# Patient Record
Sex: MALE | Race: WHITE | NOT HISPANIC OR LATINO | Employment: FULL TIME | URBAN - NONMETROPOLITAN AREA
[De-identification: names, ages, dates, MRNs, and addresses within clinical notes are randomized per-mention and may not be internally consistent; named-entity substitution may affect disease eponyms.]

---

## 2023-02-16 PROBLEM — E66.3 OVERWEIGHT (BMI 25.0-29.9): Status: ACTIVE | Noted: 2023-02-16

## 2023-02-16 PROBLEM — R68.2 DRY MOUTH: Status: ACTIVE | Noted: 2023-02-16

## 2023-02-16 PROBLEM — S63.619A SPRAIN OF FINGER, RIGHT: Status: ACTIVE | Noted: 2023-02-16

## 2023-02-16 PROBLEM — Z85.818 HISTORY OF MALIGNANT NEOPLASM OF TONSIL: Status: ACTIVE | Noted: 2023-02-16

## 2023-02-16 PROBLEM — E03.9 HYPOTHYROIDISM: Status: ACTIVE | Noted: 2023-02-16

## 2023-02-16 PROBLEM — C10.9 OROPHARYNGEAL CANCER (MULTI): Status: ACTIVE | Noted: 2023-02-16

## 2023-02-16 PROBLEM — R13.12 OROPHARYNGEAL DYSPHAGIA: Status: ACTIVE | Noted: 2023-02-16

## 2023-02-16 PROBLEM — M77.12 LATERAL EPICONDYLITIS OF LEFT ELBOW: Status: ACTIVE | Noted: 2023-02-16

## 2023-02-16 PROBLEM — E78.5 HYPERLIPIDEMIA: Status: ACTIVE | Noted: 2023-02-16

## 2023-02-16 PROBLEM — R07.9 CHEST PAIN, UNSPECIFIED: Status: ACTIVE | Noted: 2023-02-16

## 2023-02-16 PROBLEM — M19.042: Status: ACTIVE | Noted: 2023-02-16

## 2023-02-16 PROBLEM — I10 HYPERTENSION: Status: ACTIVE | Noted: 2023-02-16

## 2023-02-16 RX ORDER — ROSUVASTATIN CALCIUM 10 MG/1
1 TABLET, COATED ORAL DAILY
COMMUNITY
Start: 2020-03-01 | End: 2023-03-30 | Stop reason: SDUPTHER

## 2023-02-16 RX ORDER — LEVOTHYROXINE SODIUM 50 UG/1
50 TABLET ORAL DAILY
COMMUNITY
Start: 2022-05-09 | End: 2023-10-30 | Stop reason: SDUPTHER

## 2023-02-16 RX ORDER — LOSARTAN POTASSIUM 100 MG/1
1 TABLET ORAL DAILY
COMMUNITY
Start: 2022-09-28 | End: 2023-03-29 | Stop reason: SDUPTHER

## 2023-03-29 ENCOUNTER — OFFICE VISIT (OUTPATIENT)
Dept: PRIMARY CARE | Facility: CLINIC | Age: 63
End: 2023-03-29
Payer: COMMERCIAL

## 2023-03-29 VITALS
WEIGHT: 190.2 LBS | HEIGHT: 69 IN | BODY MASS INDEX: 28.17 KG/M2 | HEART RATE: 76 BPM | TEMPERATURE: 97.8 F | OXYGEN SATURATION: 96 % | DIASTOLIC BLOOD PRESSURE: 79 MMHG | SYSTOLIC BLOOD PRESSURE: 120 MMHG | RESPIRATION RATE: 12 BRPM

## 2023-03-29 DIAGNOSIS — E66.3 OVERWEIGHT (BMI 25.0-29.9): ICD-10-CM

## 2023-03-29 DIAGNOSIS — C10.9 OROPHARYNGEAL CANCER (MULTI): ICD-10-CM

## 2023-03-29 DIAGNOSIS — E03.9 ACQUIRED HYPOTHYROIDISM: ICD-10-CM

## 2023-03-29 DIAGNOSIS — I10 PRIMARY HYPERTENSION: Primary | ICD-10-CM

## 2023-03-29 DIAGNOSIS — E78.2 MIXED HYPERLIPIDEMIA: ICD-10-CM

## 2023-03-29 PROBLEM — R07.9 CHEST PAIN, UNSPECIFIED: Status: RESOLVED | Noted: 2023-02-16 | Resolved: 2023-03-29

## 2023-03-29 PROBLEM — S63.619A SPRAIN OF FINGER, RIGHT: Status: RESOLVED | Noted: 2023-02-16 | Resolved: 2023-03-29

## 2023-03-29 PROBLEM — Z85.818 HISTORY OF MALIGNANT NEOPLASM OF TONSIL: Status: RESOLVED | Noted: 2023-02-16 | Resolved: 2023-03-29

## 2023-03-29 PROCEDURE — 1036F TOBACCO NON-USER: CPT | Performed by: FAMILY MEDICINE

## 2023-03-29 PROCEDURE — 3074F SYST BP LT 130 MM HG: CPT | Performed by: FAMILY MEDICINE

## 2023-03-29 PROCEDURE — 99214 OFFICE O/P EST MOD 30 MIN: CPT | Performed by: FAMILY MEDICINE

## 2023-03-29 PROCEDURE — 3078F DIAST BP <80 MM HG: CPT | Performed by: FAMILY MEDICINE

## 2023-03-29 RX ORDER — LOSARTAN POTASSIUM 100 MG/1
100 TABLET ORAL DAILY
Qty: 30 TABLET | Refills: 0 | Status: SHIPPED | OUTPATIENT
Start: 2023-03-29 | End: 2023-03-30 | Stop reason: SDUPTHER

## 2023-03-29 NOTE — PROGRESS NOTES
"Subjective     Patient ID: Raoul Younger is a 62 y.o. male who presents for follow up of chronic medical conditions.    High blood pressure evaluation.     Review of symptoms:  Fatigue: N  Headaches: N  Blurred vision: N  Palpitations: N  Chest pains: N  Shortness of Breath: N  Swelling of legs: N  Blood in urine: N   Taking medications daily: Y  Medication side effects: N  Problems with medication compliance:N  Baby Aspirin 81 mg daily: N     High cholesterol evaluation.     Supplements: N   specific diet plan:  N  exercising 30 min daily?: N     Fatigue:N  Chest pains:N  Shortness of Breath:N  Sudden Headaches: N  Vision loss: N   Syncope (fainting): N   Leg Claudication (limping/leg tiredness): N   Taking medication daily: \Y   Medication side effects:N    HPI  Home blood pressures have been stable since starting losartan. BP usually run in normal range. Highest readings have been in low 130s systolic.    He was seen by Dr. Arboleda for episode of chest tightness on 1/31/22 while out at dinner. He was worried for possible MI but symptoms resolved on their own so he didn't go to the ED. He states it was a stressful night which he thinks may have contributed. EKG and blood work was negative. He has not had any similar symptoms since then.    Review of Systems  constitutional: as per HPI  eyes:as per HPI  CV:as per HPI  Respiratory:as per HPI  GI:as per HPI  neuro:as per HPI  endo:as per HPI  heme/lymph:as per HPI     Objective   /79 (BP Location: Right arm, Patient Position: Sitting, BP Cuff Size: Adult)   Pulse 76   Temp 36.6 °C (97.8 °F) (Temporal)   Resp 12   Ht 1.753 m (5' 9\")   Wt 86.3 kg (190 lb 3.2 oz)   SpO2 96%   BMI 28.09 kg/m²   Physical Exam  Constitutional:       Appearance: Normal appearance. He is overweight.   Cardiovascular:      Rate and Rhythm: Normal rate and regular rhythm.   Pulmonary:      Effort: Pulmonary effort is normal.      Breath sounds: Normal breath sounds. "         Assessment/Plan   Problem List Items Addressed This Visit          Circulatory    Primary hypertension - Primary     Stable  Blood pressure in office today was   BP Readings from Last 1 Encounters:   03/29/23 120/79   The goal range for blood pressure is below 130/80.   We will continue to monitor.   Continue losartan 100 mg daily.         Relevant Medications    losartan (Cozaar) 100 mg tablet       Endocrine/Metabolic    Acquired hypothyroidism     Stable  Continue Synthroid 50 mcg daily.         Overweight (BMI 25.0-29.9)     Body mass index is 28.09 kg/m².  Ideal weight is BMI 25 or under.  Think of healthy lifestyle changes rather than a diet  Weight loss is 75% diet and 25% exercise   Think of daily plate that includes 50% vegetables and do not count peas, corn, white potatoes as a vegetable. 25% complex grains/starch, 25% protein/fat.  Ideal diet is predominately plant based - Vegetables and Fruit. Protein from non meat sources ideal (whole beans, chickpeas). If choosing meat source for protein - first choice is fish in omega-3 such as [place]. Next choice is skinless poultry and last choice and infrequent should be red meat.  Weight loss can be helped through mindful eating. There are apps that can be used to assist with keeping track of your food water and exercise, such as My fitness pal Can see nutritionist if preferred.   Losing 4-6 lbs a month is a sufficient means of gradually losing and maintaining weight loss (good healthy, long term weight loss).             Other    Mixed hyperlipidemia     Stable.  Continue rosuvastatin 10 mg daily.         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 No evidence of disease.  Follows with ENT Dr. Faust. Planning for follow up in April/May.               By signing my name below I, misti Castillo, attest that this documentation has been prepared under the direction and in the presence of   Mansoor Floyd. 03/30/23

## 2023-03-29 NOTE — ASSESSMENT & PLAN NOTE
Body mass index is 28.09 kg/m².  Ideal weight is BMI 25 or under.  Think of healthy lifestyle changes rather than a diet  Weight loss is 75% diet and 25% exercise   Think of daily plate that includes 50% vegetables and do not count peas, corn, white potatoes as a vegetable. 25% complex grains/starch, 25% protein/fat.  Ideal diet is predominately plant based - Vegetables and Fruit. Protein from non meat sources ideal (whole beans, chickpeas). If choosing meat source for protein - first choice is fish in omega-3 such as [place]. Next choice is skinless poultry and last choice and infrequent should be red meat.  Weight loss can be helped through mindful eating. There are apps that can be used to assist with keeping track of your food water and exercise, such as My fitness pal Can see nutritionist if preferred.   Losing 4-6 lbs a month is a sufficient means of gradually losing and maintaining weight loss (good healthy, long term weight loss).

## 2023-03-29 NOTE — ASSESSMENT & PLAN NOTE
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 No evidence of disease.  Follows with ENT Dr. Faust. Planning for follow up in April/May.

## 2023-03-29 NOTE — ASSESSMENT & PLAN NOTE
Stable  Blood pressure in office today was   BP Readings from Last 1 Encounters:   03/29/23 120/79     The goal range for blood pressure is below 130/80.   We will continue to monitor.   Continue losartan 100 mg daily.

## 2023-03-30 DIAGNOSIS — E78.2 MIXED HYPERLIPIDEMIA: Primary | ICD-10-CM

## 2023-03-30 DIAGNOSIS — I10 PRIMARY HYPERTENSION: ICD-10-CM

## 2023-03-30 RX ORDER — LOSARTAN POTASSIUM 100 MG/1
100 TABLET ORAL DAILY
Qty: 90 TABLET | Refills: 1 | Status: SHIPPED | OUTPATIENT
Start: 2023-03-30 | End: 2023-03-31 | Stop reason: SDUPTHER

## 2023-03-30 RX ORDER — ROSUVASTATIN CALCIUM 10 MG/1
10 TABLET, COATED ORAL DAILY
Qty: 90 TABLET | Refills: 1 | Status: SHIPPED | OUTPATIENT
Start: 2023-03-30 | End: 2024-04-01

## 2023-03-31 DIAGNOSIS — I10 PRIMARY HYPERTENSION: ICD-10-CM

## 2023-03-31 RX ORDER — LOSARTAN POTASSIUM 100 MG/1
100 TABLET ORAL DAILY
Qty: 30 TABLET | Refills: 0 | Status: SHIPPED | OUTPATIENT
Start: 2023-03-31 | End: 2023-04-11 | Stop reason: SDUPTHER

## 2023-04-11 DIAGNOSIS — I10 PRIMARY HYPERTENSION: ICD-10-CM

## 2023-04-11 RX ORDER — LOSARTAN POTASSIUM 100 MG/1
100 TABLET ORAL DAILY
Qty: 90 TABLET | Refills: 1 | Status: SHIPPED | OUTPATIENT
Start: 2023-04-11 | End: 2023-10-03 | Stop reason: DRUGHIGH

## 2023-06-19 LAB — THYROTROPIN (MIU/L) IN SER/PLAS BY DETECTION LIMIT <= 0.05 MIU/L: 3.61 MIU/L (ref 0.44–3.98)

## 2023-10-03 ENCOUNTER — OFFICE VISIT (OUTPATIENT)
Dept: PRIMARY CARE | Facility: CLINIC | Age: 63
End: 2023-10-03
Payer: COMMERCIAL

## 2023-10-03 VITALS
RESPIRATION RATE: 14 BRPM | SYSTOLIC BLOOD PRESSURE: 97 MMHG | TEMPERATURE: 98 F | WEIGHT: 192.2 LBS | DIASTOLIC BLOOD PRESSURE: 60 MMHG | BODY MASS INDEX: 26.81 KG/M2 | HEART RATE: 82 BPM | OXYGEN SATURATION: 92 %

## 2023-10-03 DIAGNOSIS — Z00.00 WELLNESS EXAMINATION: ICD-10-CM

## 2023-10-03 DIAGNOSIS — Z12.5 PROSTATE CANCER SCREENING: ICD-10-CM

## 2023-10-03 DIAGNOSIS — C10.9 OROPHARYNGEAL CANCER (MULTI): ICD-10-CM

## 2023-10-03 DIAGNOSIS — E03.9 HYPOTHYROIDISM, UNSPECIFIED TYPE: ICD-10-CM

## 2023-10-03 DIAGNOSIS — Z11.4 ENCOUNTER FOR SCREENING FOR HIV: ICD-10-CM

## 2023-10-03 DIAGNOSIS — E03.9 ACQUIRED HYPOTHYROIDISM: ICD-10-CM

## 2023-10-03 DIAGNOSIS — E78.2 MIXED HYPERLIPIDEMIA: Primary | ICD-10-CM

## 2023-10-03 DIAGNOSIS — I10 PRIMARY HYPERTENSION: ICD-10-CM

## 2023-10-03 DIAGNOSIS — E66.3 OVERWEIGHT (BMI 25.0-29.9): ICD-10-CM

## 2023-10-03 PROBLEM — G89.29 CHRONIC PAIN OF LEFT THUMB: Status: ACTIVE | Noted: 2023-10-03

## 2023-10-03 PROBLEM — M79.645 CHRONIC PAIN OF LEFT THUMB: Status: ACTIVE | Noted: 2023-10-03

## 2023-10-03 PROBLEM — M77.12 LATERAL EPICONDYLITIS OF LEFT ELBOW: Status: RESOLVED | Noted: 2023-02-16 | Resolved: 2023-10-03

## 2023-10-03 PROBLEM — M19.042: Status: RESOLVED | Noted: 2023-02-16 | Resolved: 2023-10-03

## 2023-10-03 PROCEDURE — 3074F SYST BP LT 130 MM HG: CPT | Performed by: FAMILY MEDICINE

## 2023-10-03 PROCEDURE — 1036F TOBACCO NON-USER: CPT | Performed by: FAMILY MEDICINE

## 2023-10-03 PROCEDURE — 3078F DIAST BP <80 MM HG: CPT | Performed by: FAMILY MEDICINE

## 2023-10-03 PROCEDURE — 99214 OFFICE O/P EST MOD 30 MIN: CPT | Performed by: FAMILY MEDICINE

## 2023-10-03 RX ORDER — LOSARTAN POTASSIUM 100 MG/1
50 TABLET ORAL DAILY
Qty: 90 TABLET | Refills: 1 | Status: SHIPPED | OUTPATIENT
Start: 2023-10-03 | End: 2023-11-06

## 2023-10-03 ASSESSMENT — PATIENT HEALTH QUESTIONNAIRE - PHQ9
1. LITTLE INTEREST OR PLEASURE IN DOING THINGS: NOT AT ALL
2. FEELING DOWN, DEPRESSED OR HOPELESS: NOT AT ALL
SUM OF ALL RESPONSES TO PHQ9 QUESTIONS 1 AND 2: 0

## 2023-10-03 NOTE — ASSESSMENT & PLAN NOTE
Stable  Blood pressure in office today was   BP Readings from Last 1 Encounters:   10/03/23 97/60     The goal range for blood pressure is below 130/80.   We will continue to monitor.   reduce losartan 100 mg to 50mg daily   Blood pressure in office today was   BP Readings from Last 1 Encounters:   10/03/23 97/60     The goal range for blood pressure is below 130/80.   We will continue to monitor.   Continue losartan 100 mg daily.

## 2023-10-03 NOTE — ASSESSMENT & PLAN NOTE
If yawn, stretch neck with extension, will get right sided anterior pain that is temporary  1 occurrence in last 4 months

## 2023-10-03 NOTE — ASSESSMENT & PLAN NOTE
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

## 2023-10-03 NOTE — PROGRESS NOTES
Subjective     Patient ID: Raoul Younger is a 63 y.o. male who presents for follow up of chronic medical conditions.    Declines flu shot  Tdap: 08/10/2020  Cologuard: 09/14/2021    Right thumb has been giving him issues for the past couple of months. Has used a brace and tried to iceolate it. He is able to physical move the thumb but when he tells his brain to move it, it will not do it.     High blood pressure evaluation.     Review of symptoms:  Fatigue: N  Headaches:  N  Blurred vision:  N  Palpitations: N  Chest pains: N  Shortness of Breath: N  Swelling of legs: N  Blood in urine: N  Taking medications daily: Y  Medication side effects: N  Problems with medication compliance:N  Baby Aspirin 81 mg daily:  N    High cholesterol evaluation.      Supplements: N  specific diet plan: N  exercising 30 min daily?: N    Fatigue:N  Chest pains:N  Shortness of Breath:N  Sudden Headaches: N  Vision loss: N  Syncope (fainting): N  Leg Claudication (limping/leg tiredness): N  Taking medication daily: Y   Medication side effects:N            HPI    Review of Systems    Objective   BP 97/60 (BP Location: Right arm, Patient Position: Sitting, BP Cuff Size: Adult)   Pulse 82   Temp 36.7 °C (98 °F) (Temporal)   Resp 14   Wt 87.2 kg (192 lb 3.2 oz)   SpO2 92%   BMI 26.81 kg/m²   Physical Exam  Constitutional:       Appearance: Normal appearance.   Cardiovascular:      Rate and Rhythm: Normal rate and regular rhythm.      Pulses: Normal pulses.      Heart sounds: Normal heart sounds.   Pulmonary:      Effort: Pulmonary effort is normal.      Breath sounds: Normal breath sounds.   Neurological:      Mental Status: He is alert.         Assessment/Plan   Problem List Items Addressed This Visit       Mixed hyperlipidemia - Primary     Stable.  Lipid panel 6/3/2022 demonstrated good control  Due for fasting lipid to update status  Order placed  Continue rosuvastatin 10 mg daily.         Primary hypertension     Stable  Blood  pressure in office today was   BP Readings from Last 1 Encounters:   10/03/23 97/60   The goal range for blood pressure is below 130/80.   We will continue to monitor.   reduce losartan 100 mg to 50mg daily   Blood pressure in office today was   BP Readings from Last 1 Encounters:   10/03/23 97/60   The goal range for blood pressure is below 130/80.   We will continue to monitor.   Continue losartan 100 mg daily.         Relevant Medications    losartan (Cozaar) 100 mg tablet    Acquired hypothyroidism     Stable  Continue Synthroid 50 mcg daily.         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  Ideal weight is BMI 25 or under.  Think of healthy lifestyle changes rather than a diet  Weight loss is 75% diet and 25% exercise   Think of daily plate that includes 50% vegetables and do not count peas, corn, white potatoes as a vegetable. 25% complex grains/starch, 25% protein/fat.  Ideal diet is predominately plant based - Vegetables and Fruit. Protein from non meat sources ideal (whole beans, chickpeas). If choosing meat source for protein - first choice is fish in omega-3 such as [place]. Next choice is skinless poultry and last choice and infrequent should be red meat.  Weight loss can be helped through mindful eating. There are apps that can be used to assist with keeping track of your food water and exercise, such as My fitness pal Can see nutritionist if preferred.   Losing 4-6 lbs a month is a sufficient means of gradually losing and maintaining weight loss (good healthy, long term weight loss).           Other Visit Diagnoses       Wellness examination        Relevant Orders    CBC    Lipid Panel    Comprehensive Metabolic Panel     Prostate cancer screening        Relevant Orders    Prostate Specific Antigen, Screen    Hypothyroidism, unspecified type        Relevant Orders    TSH with reflex to Free T4 if abnormal    Encounter for screening for HIV        Relevant Orders    HIV 1/2 Antigen/Antibody Screen with Reflex to Confirmation

## 2023-10-03 NOTE — ASSESSMENT & PLAN NOTE
Stable.  Lipid panel 6/3/2022 demonstrated good control  Due for fasting lipid to update status  Order placed  Continue rosuvastatin 10 mg daily.

## 2023-10-03 NOTE — ASSESSMENT & PLAN NOTE
Cannot flex at IP joint fully  Pain at base of thumb    Refer ortho  Not want [facility] due to distance  [facility]commended [facility] Dr Tony Zhu

## 2023-10-06 NOTE — ASSESSMENT & PLAN NOTE
Body mass index is 26.81 kg/m².  Ideal weight is BMI 25 or under.  Think of healthy lifestyle changes rather than a diet  Weight loss is 75% diet and 25% exercise   Think of daily plate that includes 50% vegetables and do not count peas, corn, white potatoes as a vegetable. 25% complex grains/starch, 25% protein/fat.  Ideal diet is predominately plant based - Vegetables and Fruit. Protein from non meat sources ideal (whole beans, chickpeas). If choosing meat source for protein - first choice is fish in omega-3 such as [place]. Next choice is skinless poultry and last choice and infrequent should be red meat.  Weight loss can be helped through mindful eating. There are apps that can be used to assist with keeping track of your food water and exercise, such as My fitness pal Can see nutritionist if preferred.   Losing 4-6 lbs a month is a sufficient means of gradually losing and maintaining weight loss (good healthy, long term weight loss).

## 2023-10-07 ENCOUNTER — ANCILLARY PROCEDURE (OUTPATIENT)
Dept: RADIOLOGY | Facility: CLINIC | Age: 63
End: 2023-10-07
Payer: COMMERCIAL

## 2023-10-07 ENCOUNTER — LAB (OUTPATIENT)
Dept: LAB | Facility: LAB | Age: 63
End: 2023-10-07
Payer: COMMERCIAL

## 2023-10-07 DIAGNOSIS — Z00.00 WELLNESS EXAMINATION: ICD-10-CM

## 2023-10-07 DIAGNOSIS — Z12.5 PROSTATE CANCER SCREENING: ICD-10-CM

## 2023-10-07 DIAGNOSIS — R73.03 PREDIABETES: ICD-10-CM

## 2023-10-07 DIAGNOSIS — Z11.4 ENCOUNTER FOR SCREENING FOR HIV: ICD-10-CM

## 2023-10-07 DIAGNOSIS — E03.9 HYPOTHYROIDISM, UNSPECIFIED TYPE: ICD-10-CM

## 2023-10-07 DIAGNOSIS — C10.9 OROPHARYNGEAL CANCER (MULTI): ICD-10-CM

## 2023-10-07 LAB
ALBUMIN SERPL BCP-MCNC: 4.3 G/DL (ref 3.4–5)
ALP SERPL-CCNC: 49 U/L (ref 33–136)
ALT SERPL W P-5'-P-CCNC: 23 U/L (ref 10–52)
ANION GAP SERPL CALC-SCNC: 14 MMOL/L (ref 10–20)
AST SERPL W P-5'-P-CCNC: 19 U/L (ref 9–39)
BILIRUB SERPL-MCNC: 0.5 MG/DL (ref 0–1.2)
BUN SERPL-MCNC: 13 MG/DL (ref 6–23)
CALCIUM SERPL-MCNC: 9.5 MG/DL (ref 8.6–10.6)
CHLORIDE SERPL-SCNC: 107 MMOL/L (ref 98–107)
CHOLEST SERPL-MCNC: 176 MG/DL (ref 0–199)
CHOLESTEROL/HDL RATIO: 2.5
CO2 SERPL-SCNC: 27 MMOL/L (ref 21–32)
CREAT SERPL-MCNC: 1.02 MG/DL (ref 0.5–1.3)
ERYTHROCYTE [DISTWIDTH] IN BLOOD BY AUTOMATED COUNT: 12.1 % (ref 11.5–14.5)
GFR SERPL CREATININE-BSD FRML MDRD: 82 ML/MIN/1.73M*2
GLUCOSE SERPL-MCNC: 119 MG/DL (ref 74–99)
HCT VFR BLD AUTO: 46.4 % (ref 41–52)
HDLC SERPL-MCNC: 69.1 MG/DL
HGB BLD-MCNC: 15.2 G/DL (ref 13.5–17.5)
HIV 1+2 AB+HIV1 P24 AG SERPL QL IA: NONREACTIVE
LDLC SERPL CALC-MCNC: 96 MG/DL (ref 140–190)
MCH RBC QN AUTO: 30.7 PG (ref 26–34)
MCHC RBC AUTO-ENTMCNC: 32.8 G/DL (ref 32–36)
MCV RBC AUTO: 94 FL (ref 80–100)
NON HDL CHOLESTEROL: 107 MG/DL (ref 0–149)
NRBC BLD-RTO: 0 /100 WBCS (ref 0–0)
PLATELET # BLD AUTO: 200 X10*3/UL (ref 150–450)
PMV BLD AUTO: 11.4 FL (ref 7.5–11.5)
POTASSIUM SERPL-SCNC: 4.5 MMOL/L (ref 3.5–5.3)
PROT SERPL-MCNC: 7.1 G/DL (ref 6.4–8.2)
RBC # BLD AUTO: 4.95 X10*6/UL (ref 4.5–5.9)
SODIUM SERPL-SCNC: 143 MMOL/L (ref 136–145)
TRIGL SERPL-MCNC: 54 MG/DL (ref 0–149)
VLDL: 11 MG/DL (ref 0–40)
WBC # BLD AUTO: 5 X10*3/UL (ref 4.4–11.3)

## 2023-10-07 PROCEDURE — 83036 HEMOGLOBIN GLYCOSYLATED A1C: CPT

## 2023-10-07 PROCEDURE — 87389 HIV-1 AG W/HIV-1&-2 AB AG IA: CPT

## 2023-10-07 PROCEDURE — 85027 COMPLETE CBC AUTOMATED: CPT

## 2023-10-07 PROCEDURE — 84153 ASSAY OF PSA TOTAL: CPT

## 2023-10-07 PROCEDURE — 80053 COMPREHEN METABOLIC PANEL: CPT

## 2023-10-07 PROCEDURE — 71046 X-RAY EXAM CHEST 2 VIEWS: CPT | Mod: FY

## 2023-10-07 PROCEDURE — 84443 ASSAY THYROID STIM HORMONE: CPT

## 2023-10-07 PROCEDURE — 71046 X-RAY EXAM CHEST 2 VIEWS: CPT | Performed by: RADIOLOGY

## 2023-10-07 PROCEDURE — 36415 COLL VENOUS BLD VENIPUNCTURE: CPT

## 2023-10-07 PROCEDURE — 80061 LIPID PANEL: CPT

## 2023-10-08 LAB
PSA SERPL-MCNC: 1.04 NG/ML
TSH SERPL-ACNC: 2.88 MIU/L (ref 0.44–3.98)

## 2023-10-09 DIAGNOSIS — R73.03 PREDIABETES: Primary | ICD-10-CM

## 2023-10-09 LAB
EST. AVERAGE GLUCOSE BLD GHB EST-MCNC: 123 MG/DL
HBA1C MFR BLD: 5.9 %

## 2023-10-15 ENCOUNTER — TELEPHONE (OUTPATIENT)
Dept: PRIMARY CARE | Facility: CLINIC | Age: 63
End: 2023-10-15
Payer: COMMERCIAL

## 2023-10-15 DIAGNOSIS — M65.30 TRIGGER FINGER, UNSPECIFIED FINGER, UNSPECIFIED LATERALITY: Primary | ICD-10-CM

## 2023-10-16 NOTE — TELEPHONE ENCOUNTER
There was a MyChart message from Raoul asking for a different hand referral.  Please tell him I'm covering for Dr Floyd, and:  In his old records there was no consultation note to remind us who he saw previously.  Since Dr Zhu at [facility] is not covered, I'd be surprised if any of the other hand surgeons there are.    He wondered about Dr Jemal Nelson, but he is a foot doctor.  If none of the other hand specialists there are covered, we can place a referral to one of ours

## 2023-10-26 ENCOUNTER — APPOINTMENT (OUTPATIENT)
Dept: ORTHOPEDIC SURGERY | Facility: CLINIC | Age: 63
End: 2023-10-26
Payer: COMMERCIAL

## 2023-10-30 DIAGNOSIS — E03.9 ACQUIRED HYPOTHYROIDISM: ICD-10-CM

## 2023-10-30 RX ORDER — LEVOTHYROXINE SODIUM 50 UG/1
50 TABLET ORAL DAILY
Qty: 90 TABLET | Refills: 2 | Status: SHIPPED | OUTPATIENT
Start: 2023-10-30 | End: 2024-04-15 | Stop reason: ALTCHOICE

## 2023-11-06 DIAGNOSIS — I10 PRIMARY HYPERTENSION: ICD-10-CM

## 2023-11-06 RX ORDER — LOSARTAN POTASSIUM 100 MG/1
100 TABLET ORAL DAILY
Qty: 90 TABLET | Refills: 1 | Status: SHIPPED | OUTPATIENT
Start: 2023-11-06 | End: 2024-04-09 | Stop reason: DRUGHIGH

## 2023-12-15 NOTE — PROGRESS NOTES
Cancer follow up  TSH: Due 10/24  Lab Results   Component Value Date    TSH 2.88 10/07/2023   Chest: Due 10/24    Chief Complaint   Patient presents with    Head And Neck Cancer     Follow up     HPI:  Raoul Younger is a 63 y.o. male following up with me today for his T2N0M0 SCCa, HPV+ left tonsil cancer s/p XRT alone completed 3/1/19. Last seen 6/23.  Mild dysphagia but tolerating po. Weight stable. TSH and CXR 10/23 normal.  Finds his neck will sometimes be uncomfortable when he is in one position for too long with his neck down for example and then has to look up.  Otherwise he is doing well.  No new concerns today    History:  Dx: T2N0M0 left tonsil SCCa, HPV+  8/18: +odynophagia  9/18: Seen by his PCP dx with viral URI.   10/18: Followed up with his PCP when his symptoms did not improve. Strep negative. He was treated with antibiotics. 10/18: He returned 2 weeks later when there was no change with antibiotics. Treated with PPI without any change.   11/15/18: Referral to Dr. Saenz who diagnosed a left tonsil mass.   11/27/18: CT neck with contrast obtained 11/27/18 which showed a 2.1x1.9x2.5cm enhancing mass in the superior left oropharynx consistent with tonsillar neoplasm. No pathologic lymphadenopathy.   12/11/18: DL/biopsy, esoph/bronch, final pathology HPV+ SCCa, mass of the superior tonsil with significant involvement of the soft palate  12/26/18: CT chest negative for metastatic disease  1/22-3/1/19: Completed curative radiation therapy 70Gy in 35 fractions, no tx breaks.  8/2/19: CT neck w/contrast no evidence of recurrent masses or lesions  12/19: CXR negative  12/19: TSH (3.67) normal  1/2020: Insurance denied post treatment PET  3/20: Hand foot and mouth. Strep negative   8/20: TSH 4.37 (mild hypothyroid)  12/20: CXR normal  12/20: TSH 4.38 (mild hypothyroid)  5/21: TSH normal on synthroid  7/22: TSH normal on synthroid  10/22: CXR normal  10/23: CXR normal, TSH normal on synthroid     SH:  Tob:  Never  Marijuana. quit 20 years ago   ETOH: beer and wine 1-2 drinks/day   Here alone    ROS:  Review of Systems   Constitutional:  Negative for appetite change, chills, fatigue, fever and unexpected weight change.   HENT:  Negative for dental problem, drooling, ear pain, facial swelling, hearing loss, mouth sores, sore throat, tinnitus, trouble swallowing and voice change.    Respiratory:  Negative for cough, shortness of breath and stridor.    Gastrointestinal:  Negative for nausea and vomiting.   Musculoskeletal:  Negative for neck pain.   Hematological:  Negative for adenopathy.   All other systems reviewed and are negative.       PE:  ENT Physical Exam  Constitutional  Appearance: patient appears well-developed, patient is cooperative;  Communication/Voice: Communication comments: Coarse voice which is stable for him  Head and Face  Appearance: head appears normal and face appears normal;  Ear  Auricles: right auricle normal; left auricle normal;  Ear Canals: right ear canal normal; left ear canal normal;  Tympanic Membranes: right tympanic membrane normal; left tympanic membrane normal;  Nose  External Nose: nares patent bilaterally; external nose normal;  Internal Nose: nasal mucosa normal; septum normal;  Oral Cavity/Oropharynx  Lips: normal;  Gums: gingiva normal;  Tongue: normal;  Oral mucosa: mucous membranes dry;  OC/OP comments: Oropharynx without new masses or lesions, soft to palpation  Neck  Neck: radiation changes present; normal trachea;  Respiratory  Inspection: breathing unlabored;  Cardiovascular  Inspection: extremities are warm and well perfused;  Lymphatic  Palpation: no cervical adenopathy noted;  Neurovestibular  Mental Status: alert and oriented;  Psychiatric: mood normal; affect is appropriate;       Procedures   PROCEDURE NOTE:  Recommended flexible nasopharyngoscopy.  Risks, benefits, personnel and alternatives were explained.  The patient wished to proceed.  S/he was  re-identified.  PROCEDURE:  Flexible nasopharyngoscopy  PREOPERATIVE DIAGNOSIS: Oropharyngeal cancer surveillance  POSTOPERATIVE DIAGNOSIS: No evidence of recurrence, radiation changes, no masses  INDICATIONS: Inability to tolerate mirror exam  PROCEDURE NOTE:  Recommended flexible nasopharyngoscopy.  Risks, benefits, personnel and alternatives were explained.  The patient wished to proceed.  S/he was re-identified.  FINDINGS:  The nasopharynx was normal without any lesions or masses visualized.  Oropharynx with radiation changes but no masses.  No masses or lesions were visualized at the base of tongue, vallecula, epiglottis, aryepiglottic folds, pyriform sinuses, and lateral pharyngeal walls.  True vocal fold movement was normal.  The patient's airway was widely patent with no evidence of obstruction.  Patient tolerated the procedure well, and there were no complications.     ASSESSMENT AND PLAN:  Problem List Items Addressed This Visit       Dry mouth    Current Assessment & Plan     Chronic, adverse effect of radiation  Continue conservative tx, sips of water, etc         Acquired hypothyroidism    Current Assessment & Plan     Well controlled on daily synthroid  TSH now normal         Oropharyngeal cancer (CMS/HCC)    Current Assessment & Plan     No evidence of dx  Endoscopy negative today  4 years out from tx  Annual CXR negative  Follow up in 6 months         Oropharyngeal dysphagia - Primary    Current Assessment & Plan     Chronic, mild  Tolerating soft/modified diet conservative tx measures           Elvia Faust MD    Head & Neck Surgical Oncology & Reconstruction  Department of Otolaryngology - Head and Neck Surgery     By signing my name below, CHRISTOPHE, Jennie Arteaga, attest that this documentation has been prepared under the direction and in the presence of Dr. Elvia Faust MD.     All medical record entries made by the Abiodunibphilip were at my direction and personally dictated by me, Dr. Gan  Govind. I have reviewed the chart and agree that the record accurately reflects my personal performance of the history, physical exam, discussion and plan.

## 2023-12-18 ENCOUNTER — OFFICE VISIT (OUTPATIENT)
Dept: OTOLARYNGOLOGY | Facility: CLINIC | Age: 63
End: 2023-12-18
Payer: COMMERCIAL

## 2023-12-18 DIAGNOSIS — C10.9 OROPHARYNGEAL CANCER (MULTI): ICD-10-CM

## 2023-12-18 DIAGNOSIS — R13.12 OROPHARYNGEAL DYSPHAGIA: Primary | ICD-10-CM

## 2023-12-18 DIAGNOSIS — E03.9 ACQUIRED HYPOTHYROIDISM: ICD-10-CM

## 2023-12-18 DIAGNOSIS — R68.2 DRY MOUTH: ICD-10-CM

## 2023-12-18 PROCEDURE — 1036F TOBACCO NON-USER: CPT | Performed by: OTOLARYNGOLOGY

## 2023-12-18 PROCEDURE — 92511 NASOPHARYNGOSCOPY: CPT | Performed by: OTOLARYNGOLOGY

## 2023-12-18 PROCEDURE — 99214 OFFICE O/P EST MOD 30 MIN: CPT | Performed by: OTOLARYNGOLOGY

## 2023-12-18 ASSESSMENT — ENCOUNTER SYMPTOMS
VOICE CHANGE: 0
NAUSEA: 0
FATIGUE: 0
STRIDOR: 0
FEVER: 0
SORE THROAT: 0
VOMITING: 0
NECK PAIN: 0
CHILLS: 0
COUGH: 0
TROUBLE SWALLOWING: 0
APPETITE CHANGE: 0
FACIAL SWELLING: 0
ADENOPATHY: 0
SHORTNESS OF BREATH: 0
UNEXPECTED WEIGHT CHANGE: 0

## 2023-12-18 NOTE — ASSESSMENT & PLAN NOTE
No evidence of dx  Endoscopy negative today  4 years out from tx  Annual CXR negative  Follow up in 6 months

## 2023-12-18 NOTE — PATIENT INSTRUCTIONS
Dr. Faust evaluated you today.    Your care plan is outlined below:  -- Follow up with Dr. Faust in 6 mo.  This appointment was scheduled at the end of your visit today.  If you need to reschedule, please call the office at [phone].  Please keep in mind that last minute cancellations often result in delayed follow-up appointments.     General appointment line please call [phone]  For general questions or scheduling issues please call [phone] option #2   For medical questions or surgery scheduling please call [phone] on Mondays, Wednesdays and Thursdays or [phone] on Tuesdays and Fridays. Please be sure to leave a voice mail or your call will not be able to be returned.     Dr. Faust makes every effort to run on time for your appointments.  Therefore, if you are more than 30 minutes late for your appointment, unrelated to a scan or another appointment such as chemotherapy or radiation, your appointment will need to be rescheduled to another day.  We appreciate your understanding.

## 2024-04-01 DIAGNOSIS — E78.2 MIXED HYPERLIPIDEMIA: ICD-10-CM

## 2024-04-01 RX ORDER — ROSUVASTATIN CALCIUM 10 MG/1
TABLET, COATED ORAL
Qty: 90 TABLET | Refills: 3 | Status: SHIPPED | OUTPATIENT
Start: 2024-04-01 | End: 2024-04-09 | Stop reason: SDUPTHER

## 2024-04-02 DIAGNOSIS — E78.2 MIXED HYPERLIPIDEMIA: ICD-10-CM

## 2024-04-02 RX ORDER — ROSUVASTATIN CALCIUM 10 MG/1
TABLET, COATED ORAL
OUTPATIENT
Start: 2024-04-02

## 2024-04-03 ENCOUNTER — APPOINTMENT (OUTPATIENT)
Dept: PRIMARY CARE | Facility: CLINIC | Age: 64
End: 2024-04-03
Payer: COMMERCIAL

## 2024-04-09 ENCOUNTER — LAB (OUTPATIENT)
Dept: LAB | Facility: LAB | Age: 64
End: 2024-04-09
Payer: COMMERCIAL

## 2024-04-09 ENCOUNTER — OFFICE VISIT (OUTPATIENT)
Dept: PRIMARY CARE | Facility: CLINIC | Age: 64
End: 2024-04-09
Payer: COMMERCIAL

## 2024-04-09 VITALS
BODY MASS INDEX: 26.97 KG/M2 | WEIGHT: 193.4 LBS | RESPIRATION RATE: 14 BRPM | SYSTOLIC BLOOD PRESSURE: 116 MMHG | DIASTOLIC BLOOD PRESSURE: 73 MMHG | HEART RATE: 71 BPM | OXYGEN SATURATION: 96 % | TEMPERATURE: 98.2 F

## 2024-04-09 DIAGNOSIS — E03.9 HYPOTHYROIDISM, UNSPECIFIED TYPE: ICD-10-CM

## 2024-04-09 DIAGNOSIS — C10.9 OROPHARYNGEAL CANCER (MULTI): ICD-10-CM

## 2024-04-09 DIAGNOSIS — R73.03 PREDIABETES: ICD-10-CM

## 2024-04-09 DIAGNOSIS — E78.2 MIXED HYPERLIPIDEMIA: Primary | ICD-10-CM

## 2024-04-09 DIAGNOSIS — I10 PRIMARY HYPERTENSION: ICD-10-CM

## 2024-04-09 DIAGNOSIS — E03.9 ACQUIRED HYPOTHYROIDISM: ICD-10-CM

## 2024-04-09 PROCEDURE — 83036 HEMOGLOBIN GLYCOSYLATED A1C: CPT

## 2024-04-09 PROCEDURE — 3078F DIAST BP <80 MM HG: CPT | Performed by: FAMILY MEDICINE

## 2024-04-09 PROCEDURE — 1036F TOBACCO NON-USER: CPT | Performed by: FAMILY MEDICINE

## 2024-04-09 PROCEDURE — 84439 ASSAY OF FREE THYROXINE: CPT

## 2024-04-09 PROCEDURE — 84443 ASSAY THYROID STIM HORMONE: CPT

## 2024-04-09 PROCEDURE — 99214 OFFICE O/P EST MOD 30 MIN: CPT | Performed by: FAMILY MEDICINE

## 2024-04-09 PROCEDURE — 3074F SYST BP LT 130 MM HG: CPT | Performed by: FAMILY MEDICINE

## 2024-04-09 PROCEDURE — 36415 COLL VENOUS BLD VENIPUNCTURE: CPT

## 2024-04-09 RX ORDER — LOSARTAN POTASSIUM 100 MG/1
50 TABLET ORAL DAILY
Qty: 90 TABLET | Refills: 3 | Status: SHIPPED | OUTPATIENT
Start: 2024-04-09 | End: 2024-05-14 | Stop reason: SDUPTHER

## 2024-04-09 RX ORDER — ROSUVASTATIN CALCIUM 10 MG/1
10 TABLET, COATED ORAL DAILY
Qty: 90 TABLET | Refills: 3 | Status: SHIPPED | OUTPATIENT
Start: 2024-04-09

## 2024-04-09 ASSESSMENT — ENCOUNTER SYMPTOMS
SHORTNESS OF BREATH: 0
HEADACHES: 0
PALPITATIONS: 0
FATIGUE: 0
TROUBLE SWALLOWING: 0
HEMATURIA: 0

## 2024-04-09 NOTE — PROGRESS NOTES
"Subjective     Patient ID: Raoul Younger is a 63 y.o. male who presents for follow up of chronic medical conditions.    Declines flu shot.   Cologuard due 09/2024        High blood pressure evaluation.      Review of symptoms:  Fatigue: N  Headaches:  N  Blurred vision:  N  Palpitations: N  Chest pains: N  Shortness of Breath: N  Swelling of legs: N  Blood in urine: N  Taking medications daily: Y  Medication side effects: N  Problems with medication compliance:N  Baby Aspirin 81 mg daily:  N     High cholesterol evaluation.      Supplements: N  specific diet plan: N  exercising 30 min daily?: N     Fatigue:N  Chest pains:N  Shortness of Breath:N  Sudden Headaches: N  Vision loss: N  Syncope (fainting): N  Leg Claudication (limping/leg tiredness): N  Taking medication daily: Y   Medication side effects:N    He is not really focused on his diet. He reports not searching out certain things like sweets, but he will not avoid them either. He does drink at least one alcoholic drink per night. 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   He is still following with Dr. Faust for his mouth symptoms. No difficulty swallowing. He had mentioned at his last visit that he occasionally get a spasm in the back of his neck. It mainly occurs when at work looking over papers. He wonders if it was due to his radiation treatment.   His blood pressure has remained stable on losartan 50 mg.         Review of Systems   Constitutional:  Negative for fatigue.   HENT:  Negative for trouble swallowing.    Eyes:  Negative for visual disturbance.   Respiratory:  Negative for shortness of breath.    Cardiovascular:  Negative for chest pain, palpitations and leg swelling.   Genitourinary:  Negative for hematuria.   Neurological:  Negative for headaches.       Objective   /73 (BP Location: Right arm, Patient Position: Sitting, " BP Cuff Size: Adult)   Pulse 71   Temp 36.8 °C (98.2 °F) (Temporal)   Resp 14   Wt 87.7 kg (193 lb 6.4 oz)   SpO2 96%   BMI 26.97 kg/m²     Physical Exam  Constitutional:       Appearance: Normal appearance.   Cardiovascular:      Rate and Rhythm: Normal rate and regular rhythm.      Pulses: Normal pulses.      Heart sounds: Normal heart sounds.   Pulmonary:      Effort: Pulmonary effort is normal.      Breath sounds: Normal breath sounds.   Neurological:      General: No focal deficit present.      Mental Status: He is alert.   Psychiatric:         Mood and Affect: Mood normal.         Behavior: Behavior normal.         Thought Content: Thought content normal.         Judgment: Judgment normal.         Assessment/Plan   Problem List Items Addressed This Visit       Mixed hyperlipidemia - Primary     Lab Results   Component Value Date    CHOL 176 10/07/2023    CHOL 167 07/02/2022    CHOL 201 (H) 08/11/2020     Lab Results   Component Value Date    HDL 69.1 10/07/2023    HDL 71.0 07/02/2022    HDL 78.0 08/11/2020     Lab Results   Component Value Date    LDLCALC 96 (L) 10/07/2023     Lab Results   Component Value Date    TRIG 54 10/07/2023    TRIG 89 07/02/2022    TRIG 69 08/11/2020   Stable.  Continue rosuvastatin 10 mg daily.         Relevant Medications    rosuvastatin (Crestor) 10 mg tablet    Primary hypertension     Blood pressure in office today was   BP Readings from Last 1 Encounters:   04/09/24 116/73   The goal range for blood pressure is below 130/80.   We will continue to monitor.   Continue losartan 50 mg daily          Relevant Medications    losartan (Cozaar) 100 mg tablet    Acquired hypothyroidism     Lab Results   Component Value Date    TSH 2.88 10/07/2023   Stable  Continue Synthroid 50 mcg daily.  Check TSH          Oropharyngeal cancer (CMS/HCC)     No evidence of dx  5 years out from tx  Follow up Dr Faust for 6 month interval 6/3/2024          Other Visit Diagnoses        Hypothyroidism, unspecified type        Relevant Orders    TSH with reflex to Free T4 if abnormal    Prediabetes        Relevant Orders    Hemoglobin A1C            Scribe Attestation  By signing my name below, I, Jennie Lu   attest that this documentation has been prepared under the direction and in the presence of Mansoor Floyd MD.

## 2024-04-09 NOTE — ASSESSMENT & PLAN NOTE
Lab Results   Component Value Date    TSH 2.88 10/07/2023   Stable  Continue Synthroid 50 mcg daily.  Check TSH

## 2024-04-09 NOTE — ASSESSMENT & PLAN NOTE
Blood pressure in office today was   BP Readings from Last 1 Encounters:   04/09/24 116/73   The goal range for blood pressure is below 130/80.   We will continue to monitor.   Continue losartan 50 mg daily

## 2024-04-09 NOTE — ASSESSMENT & PLAN NOTE
Lab Results   Component Value Date    CHOL 176 10/07/2023    CHOL 167 07/02/2022    CHOL 201 (H) 08/11/2020     Lab Results   Component Value Date    HDL 69.1 10/07/2023    HDL 71.0 07/02/2022    HDL 78.0 08/11/2020     Lab Results   Component Value Date    LDLCALC 96 (L) 10/07/2023     Lab Results   Component Value Date    TRIG 54 10/07/2023    TRIG 89 07/02/2022    TRIG 69 08/11/2020   Stable.  Continue rosuvastatin 10 mg daily.

## 2024-04-10 LAB
EST. AVERAGE GLUCOSE BLD GHB EST-MCNC: 123 MG/DL
HBA1C MFR BLD: 5.9 %
T4 FREE SERPL-MCNC: 0.94 NG/DL (ref 0.78–1.48)
TSH SERPL-ACNC: 4.49 MIU/L (ref 0.44–3.98)

## 2024-04-15 DIAGNOSIS — E03.9 ACQUIRED HYPOTHYROIDISM: ICD-10-CM

## 2024-04-15 RX ORDER — LEVOTHYROXINE SODIUM 25 UG/1
25 TABLET ORAL
Qty: 90 TABLET | Refills: 3 | Status: SHIPPED | OUTPATIENT
Start: 2024-04-15

## 2024-05-14 DIAGNOSIS — I10 PRIMARY HYPERTENSION: ICD-10-CM

## 2024-05-14 RX ORDER — LOSARTAN POTASSIUM 50 MG/1
50 TABLET ORAL DAILY
Qty: 90 TABLET | Refills: 1 | Status: SHIPPED | OUTPATIENT
Start: 2024-05-14

## 2024-05-28 ASSESSMENT — ENCOUNTER SYMPTOMS
APPETITE CHANGE: 0
FACIAL SWELLING: 0
VOICE CHANGE: 0
TROUBLE SWALLOWING: 0
COUGH: 0
SORE THROAT: 0
NAUSEA: 0
STRIDOR: 0
CHILLS: 0
FEVER: 0
UNEXPECTED WEIGHT CHANGE: 0
ADENOPATHY: 0
VOMITING: 0
SHORTNESS OF BREATH: 0
FATIGUE: 0
NECK PAIN: 0

## 2024-05-28 NOTE — PROGRESS NOTES
Cancer follow up  TSH: Due 4/25  Chest: Due 10/24    Chief Complaint   Patient presents with    Follow-up    Head And Neck Cancer     Follow up     HPI:  Raoul Younger is a 63 y.o. male following up with me today for his T2N0M0 SCCa, HPV+ left tonsil cancer s/p XRT alone completed 3/1/19. Last seen 12/23. He is officially 5 years out! Mild dysphagia but tolerating po. Weight stable.  Neck cramping/discomfort is intermittent and remains positional.  Otherwise he is doing well.     History:  Dx: T2N0M0 left tonsil SCCa, HPV+  8/18: +odynophagia  9/18: Seen by his PCP dx with viral URI.   10/18: Followed up with his PCP when his symptoms did not improve. Strep negative. He was treated with antibiotics. 10/18: He returned 2 weeks later when there was no change with antibiotics. Treated with PPI without any change.   11/15/18: Referral to Dr. Saenz who diagnosed a left tonsil mass.   11/27/18: CT neck with contrast obtained 11/27/18 which showed a 2.1x1.9x2.5cm enhancing mass in the superior left oropharynx consistent with tonsillar neoplasm. No pathologic lymphadenopathy.   12/11/18: DL/biopsy, esoph/bronch, final pathology HPV+ SCCa, mass of the superior tonsil with significant involvement of the soft palate  12/26/18: CT chest negative for metastatic disease  1/22-3/1/19: Completed curative radiation therapy 70Gy in 35 fractions, no tx breaks.  8/2/19: CT neck w/contrast no evidence of recurrent masses or lesions  12/19: CXR negative  12/19: TSH (3.67) normal  1/2020: Insurance denied post treatment PET  3/20: Hand foot and mouth. Strep negative   8/20: TSH 4.37 (mild hypothyroid)  12/20: CXR normal  12/20: TSH 4.38 (mild hypothyroid)  5/21: TSH normal on synthroid  7/22: TSH normal on synthroid  10/22: CXR normal  10/23: CXR normal, TSH normal on synthroid     SH:  Tob: Never  Marijuana. quit 20 years ago   ETOH: beer and wine 1-2 drinks/day   Here alone    ROS:  Review of Systems   Constitutional:  Negative for  appetite change, chills, fatigue, fever and unexpected weight change.   HENT:  Negative for dental problem, drooling, ear pain, facial swelling, hearing loss, mouth sores, sore throat, tinnitus, trouble swallowing and voice change.    Respiratory:  Negative for cough, shortness of breath and stridor.    Gastrointestinal:  Negative for nausea and vomiting.   Musculoskeletal:  Negative for neck pain.   Hematological:  Negative for adenopathy.   All other systems reviewed and are negative.       PE:  ENT Physical Exam  Constitutional  Appearance: patient appears well-developed, patient is cooperative;  Communication/Voice: Communication comments: Coarse voice which is stable for him  Head and Face  Appearance: head appears normal and face appears normal;  Ear  Auricles: right auricle normal; left auricle normal;  Ear Canals: right ear canal normal; left ear canal normal;  Tympanic Membranes: right tympanic membrane normal; left tympanic membrane normal;  Nose  External Nose: nares patent bilaterally; external nose normal;  Internal Nose: nasal mucosa normal; septum normal;  Oral Cavity/Oropharynx  Lips: normal;  Gums: gingiva normal;  Tongue: normal;  Oral mucosa: mucous membranes dry;  OC/OP comments: Oropharynx without new masses or lesions, soft to palpation no masses or lesions  Neck  Neck: radiation changes present; normal trachea;  Respiratory  Inspection: breathing unlabored;  Cardiovascular  Inspection: extremities are warm and well perfused;  Lymphatic  Palpation: no cervical adenopathy noted;  Neurovestibular  Mental Status: alert and oriented;  Psychiatric: mood normal; affect is appropriate;       Procedures   PROCEDURE NOTE:  Recommended flexible nasopharyngoscopy.  Risks, benefits, personnel and alternatives were explained.  The patient wished to proceed.  S/he was re-identified.  PROCEDURE:  Flexible nasopharyngoscopy  PREOPERATIVE DIAGNOSIS: Oropharyngeal cancer surveillance  POSTOPERATIVE DIAGNOSIS: No  evidence of recurrence, radiation changes, no masses  INDICATIONS: Inability to tolerate mirror exam  PROCEDURE NOTE:  Recommended flexible nasopharyngoscopy.  Risks, benefits, personnel and alternatives were explained.  The patient wished to proceed.  S/he was re-identified.  FINDINGS:  The nasopharynx was normal without any lesions or masses visualized.  Oropharynx with radiation changes but no masses.  No masses or lesions were visualized at the base of tongue, vallecula, epiglottis, aryepiglottic folds, pyriform sinuses, and lateral pharyngeal walls.  True vocal fold movement was normal.  The patient's airway was widely patent with no evidence of obstruction.  Patient tolerated the procedure well, and there were no complications.     ASSESSMENT AND PLAN:  Problem List Items Addressed This Visit       Dry mouth    Current Assessment & Plan     Chronic, adverse effect of radiation  Continue moisturization         Acquired hypothyroidism - Primary    Current Assessment & Plan     Chronic, adverse effect of radiation  Continue daily synthroid         Oropharyngeal cancer (Multi)    Overview     Diagnoses 12/2018  Radiation treatment 1/2019-3/2019         Current Assessment & Plan     No evidence of disease on exam or endoscopy  Offered NavDx annually  Continue neck exercises  Follow up in 1 year  Annual TSH and CXR (ordered today)         Relevant Orders    XR chest 2 views        Elvia Faust MD    Head & Neck Surgical Oncology & Reconstruction  Department of Otolaryngology - Head and Neck Surgery     By signing my name below, I, Jennie Arteaga, attest that this documentation has been prepared under the direction and in the presence of Dr. Elvia Faust MD.     All medical record entries made by the Scribe were at my direction and personally dictated by me, Dr. Elvia Faust. I have reviewed the chart and agree that the record accurately reflects my personal performance of the history, physical exam,  discussion and plan.

## 2024-06-03 ENCOUNTER — OFFICE VISIT (OUTPATIENT)
Dept: OTOLARYNGOLOGY | Facility: CLINIC | Age: 64
End: 2024-06-03
Payer: COMMERCIAL

## 2024-06-03 VITALS — HEIGHT: 71 IN | TEMPERATURE: 97.8 F | BODY MASS INDEX: 27.16 KG/M2 | WEIGHT: 194 LBS

## 2024-06-03 DIAGNOSIS — C10.9 OROPHARYNGEAL CANCER (MULTI): ICD-10-CM

## 2024-06-03 DIAGNOSIS — E03.9 ACQUIRED HYPOTHYROIDISM: Primary | ICD-10-CM

## 2024-06-03 DIAGNOSIS — R68.2 DRY MOUTH: ICD-10-CM

## 2024-06-03 PROCEDURE — 99213 OFFICE O/P EST LOW 20 MIN: CPT | Performed by: OTOLARYNGOLOGY

## 2024-06-03 PROCEDURE — 1036F TOBACCO NON-USER: CPT | Performed by: OTOLARYNGOLOGY

## 2024-06-03 PROCEDURE — 92511 NASOPHARYNGOSCOPY: CPT | Performed by: OTOLARYNGOLOGY

## 2024-06-03 ASSESSMENT — PATIENT HEALTH QUESTIONNAIRE - PHQ9
SUM OF ALL RESPONSES TO PHQ9 QUESTIONS 1 AND 2: 0
1. LITTLE INTEREST OR PLEASURE IN DOING THINGS: NOT AT ALL
2. FEELING DOWN, DEPRESSED OR HOPELESS: NOT AT ALL

## 2024-06-04 NOTE — ASSESSMENT & PLAN NOTE
Chronic, adverse effect of radiation  Continue moisturization   decreased step length/decreased stride length

## 2024-06-04 NOTE — ASSESSMENT & PLAN NOTE
No evidence of disease on exam or endoscopy  Offered NavDx annually  Continue neck exercises  Follow up in 1 year  Annual TSH and CXR (ordered today)

## 2024-06-21 DIAGNOSIS — E03.9 ACQUIRED HYPOTHYROIDISM: ICD-10-CM

## 2024-06-21 DIAGNOSIS — I10 PRIMARY HYPERTENSION: ICD-10-CM

## 2024-06-21 RX ORDER — LOSARTAN POTASSIUM 50 MG/1
50 TABLET ORAL DAILY
Qty: 90 TABLET | Refills: 1 | Status: SHIPPED | OUTPATIENT
Start: 2024-06-21

## 2024-06-21 RX ORDER — LEVOTHYROXINE SODIUM 25 UG/1
25 TABLET ORAL
Qty: 90 TABLET | Refills: 1 | Status: SHIPPED | OUTPATIENT
Start: 2024-06-21

## 2024-10-10 ENCOUNTER — APPOINTMENT (OUTPATIENT)
Dept: PRIMARY CARE | Facility: CLINIC | Age: 64
End: 2024-10-10
Payer: COMMERCIAL

## 2024-10-10 VITALS
SYSTOLIC BLOOD PRESSURE: 124 MMHG | TEMPERATURE: 98.4 F | RESPIRATION RATE: 12 BRPM | WEIGHT: 195.1 LBS | BODY MASS INDEX: 27.21 KG/M2 | DIASTOLIC BLOOD PRESSURE: 75 MMHG | OXYGEN SATURATION: 96 % | HEART RATE: 65 BPM

## 2024-10-10 DIAGNOSIS — Z00.00 WELLNESS EXAMINATION: ICD-10-CM

## 2024-10-10 DIAGNOSIS — E03.9 HYPOTHYROIDISM, UNSPECIFIED TYPE: ICD-10-CM

## 2024-10-10 DIAGNOSIS — C10.9 OROPHARYNGEAL CANCER (MULTI): ICD-10-CM

## 2024-10-10 DIAGNOSIS — I10 PRIMARY HYPERTENSION: ICD-10-CM

## 2024-10-10 DIAGNOSIS — Z12.5 PROSTATE CANCER SCREENING: ICD-10-CM

## 2024-10-10 DIAGNOSIS — Z23 NEED FOR PNEUMOCOCCAL VACCINE: ICD-10-CM

## 2024-10-10 DIAGNOSIS — E78.2 MIXED HYPERLIPIDEMIA: Primary | ICD-10-CM

## 2024-10-10 DIAGNOSIS — E66.3 OVERWEIGHT (BMI 25.0-29.9): ICD-10-CM

## 2024-10-10 DIAGNOSIS — E03.9 ACQUIRED HYPOTHYROIDISM: ICD-10-CM

## 2024-10-10 PROCEDURE — 90677 PCV20 VACCINE IM: CPT | Performed by: FAMILY MEDICINE

## 2024-10-10 PROCEDURE — 1160F RVW MEDS BY RX/DR IN RCRD: CPT | Performed by: FAMILY MEDICINE

## 2024-10-10 PROCEDURE — 1159F MED LIST DOCD IN RCRD: CPT | Performed by: FAMILY MEDICINE

## 2024-10-10 PROCEDURE — 3078F DIAST BP <80 MM HG: CPT | Performed by: FAMILY MEDICINE

## 2024-10-10 PROCEDURE — 3074F SYST BP LT 130 MM HG: CPT | Performed by: FAMILY MEDICINE

## 2024-10-10 PROCEDURE — 90471 IMMUNIZATION ADMIN: CPT | Performed by: FAMILY MEDICINE

## 2024-10-10 PROCEDURE — 99214 OFFICE O/P EST MOD 30 MIN: CPT | Performed by: FAMILY MEDICINE

## 2024-10-10 ASSESSMENT — ENCOUNTER SYMPTOMS
RESPIRATORY NEGATIVE: 1
PSYCHIATRIC NEGATIVE: 1
CONSTITUTIONAL NEGATIVE: 1
GASTROINTESTINAL NEGATIVE: 1
NEUROLOGICAL NEGATIVE: 1
CARDIOVASCULAR NEGATIVE: 1

## 2024-10-10 NOTE — ASSESSMENT & PLAN NOTE
Last 3 BMI readings:  BMI Readings from Last 3 Encounters:   10/10/24 27.21 kg/m²   06/03/24 27.06 kg/m²   04/09/24 26.97 kg/m²   Recent vacation - did not focus on healthy diet    Body mass index is 27.21 kg/m².  Ideal weight is BMI 25 or under.  Think of healthy lifestyle changes rather than a diet  Weight loss is 75% diet and 25% exercise   Think of daily plate that includes 50% vegetables and do not count peas, corn, white potatoes as a vegetable. 25% complex grains/starch, 25% protein/fat.  Ideal diet is predominately plant based - Vegetables and Fruit. Protein from non meat sources ideal (whole beans, chickpeas). If choosing meat source for protein - first choice is fish in omega-3 such as [place]. Next choice is skinless poultry and last choice and infrequent should be red meat.  Weight loss can be helped through mindful eating. There are apps that can be used to assist with keeping track of your food water and exercise, such as My fitness pal Can see nutritionist if preferred.   Losing 4-6 lbs a month is a sufficient means of gradually losing and maintaining weight loss (good healthy, long term weight loss).

## 2024-10-10 NOTE — ASSESSMENT & PLAN NOTE
Blood pressure in office today:   BP Readings from Last 1 Encounters:   10/10/24 124/75   The goal range for blood pressure is below 130/80.   We will continue to monitor.   Continue losartan 50 mg daily

## 2024-10-10 NOTE — ASSESSMENT & PLAN NOTE
Dr. Faust's note 6/3/24:  No evidence of disease on exam or endoscopy  Offered NavDx annually  Continue neck exercises  Follow up in 1 year  Annual TSH and CXR (ordered today)

## 2024-10-10 NOTE — ASSESSMENT & PLAN NOTE
Lab Results   Component Value Date    TSH 4.49 (H) 04/09/2024   Currently on levothyroxine 25 mcg daily.    Dr. Faust note 6/3/24:  Chronic, adverse effect of radiation  Continue daily synthroid

## 2024-10-10 NOTE — PROGRESS NOTES
Subjective   Patient ID: Raoul Younger is a 64 y.o. male who presents for Follow-up (6 mo fuv).    His left shoulder still bothers him occasionally.   He continues to follow with Dr. Faust, ENT, to monitor thyroid and oropharyngeal cancer, recent endoscopy shows no cancer.   Overall doing well, no new concerns at this time. He denies any GI or urinary symptoms. Rarely he will need to get up to urinate once per night, never more.   He visits with an eye doctor annually, he just got new glasses at his last appointment. He also keeps up with the dentist, he will be having a couple crowns repaired in the next couple weeks.          Review of Systems   Constitutional: Negative.    HENT: Negative.     Respiratory: Negative.     Cardiovascular: Negative.    Gastrointestinal: Negative.    Genitourinary: Negative.    Musculoskeletal:         L shoulder pain   Neurological: Negative.    Psychiatric/Behavioral: Negative.         Objective   /75 (BP Location: Left arm, Patient Position: Sitting, BP Cuff Size: Adult)   Pulse 65   Temp 36.9 °C (98.4 °F) (Temporal)   Resp 12   Wt 88.5 kg (195 lb 1.6 oz)   SpO2 96%   BMI 27.21 kg/m²     Physical Exam  Constitutional:       Appearance: Normal appearance. He is overweight.   HENT:      Head: Normocephalic.   Eyes:      Conjunctiva/sclera: Conjunctivae normal.   Musculoskeletal:      Cervical back: Normal range of motion.   Neurological:      General: No focal deficit present.      Mental Status: He is alert.   Psychiatric:         Mood and Affect: Mood normal.         Behavior: Behavior normal.         Thought Content: Thought content normal.         Judgment: Judgment normal.         Assessment/Plan   Problem List Items Addressed This Visit       Mixed hyperlipidemia - Primary     Lab Results   Component Value Date    CHOL 176 10/07/2023    CHOL 167 07/02/2022    CHOL 201 (H) 08/11/2020     Lab Results   Component Value Date    HDL 69.1 10/07/2023    HDL 71.0  07/02/2022    HDL 78.0 08/11/2020     Lab Results   Component Value Date    LDLCALC 96 (L) 10/07/2023     Lab Results   Component Value Date    TRIG 54 10/07/2023    TRIG 89 07/02/2022    TRIG 69 08/11/2020   Stable.  Continue rosuvastatin 10 mg daily.  Repeat lipid panel ordered.         Primary hypertension     Blood pressure in office today:   BP Readings from Last 1 Encounters:   10/10/24 124/75   The goal range for blood pressure is below 130/80.   We will continue to monitor.   Continue losartan 50 mg daily          Acquired hypothyroidism     Lab Results   Component Value Date    TSH 4.49 (H) 04/09/2024   Currently on levothyroxine 25 mcg daily.    Dr. Faust note 6/3/24:  Chronic, adverse effect of radiation  Continue daily synthroid         Oropharyngeal cancer (Multi)     Dr. Faust's note 6/3/24:  No evidence of disease on exam or endoscopy  Offered NavDx annually  Continue neck exercises  Follow up in 1 year  Annual TSH and CXR (ordered today)         Overweight (BMI 25.0-29.9)     Last 3 BMI readings:  BMI Readings from Last 3 Encounters:   10/10/24 27.21 kg/m²   06/03/24 27.06 kg/m²   04/09/24 26.97 kg/m²   Recent vacation - did not focus on healthy diet    Body mass index is 27.21 kg/m².  Ideal weight is BMI 25 or under.  Think of healthy lifestyle changes rather than a diet  Weight loss is 75% diet and 25% exercise   Think of daily plate that includes 50% vegetables and do not count peas, corn, white potatoes as a vegetable. 25% complex grains/starch, 25% protein/fat.  Ideal diet is predominately plant based - Vegetables and Fruit. Protein from non meat sources ideal (whole beans, chickpeas). If choosing meat source for protein - first choice is fish in omega-3 such as [place]. Next choice is skinless poultry and last choice and infrequent should be red meat.  Weight loss can be helped through mindful eating. There are apps that can be used to assist with keeping track of your food water and exercise,  such as My fitness pal Can see nutritionist if preferred.   Losing 4-6 lbs a month is a sufficient means of gradually losing and maintaining weight loss (good healthy, long term weight loss).           Other Visit Diagnoses       Need for pneumococcal vaccine        Relevant Orders    Pneumococcal conjugate vaccine, 20-valent (PREVNAR 20) (Completed)    Wellness examination        Relevant Orders    CBC    Lipid Panel    Comprehensive Metabolic Panel    Prostate cancer screening        Relevant Orders    Prostate Specific Antigen, Screen    Hypothyroidism, unspecified type        Relevant Orders    TSH with reflex to Free T4 if abnormal          Follow up: Scheduled 4/10/2025    Scribe Attestation  By signing my name below, Cayla SAEED Scribe   attest that this documentation has been prepared under the direction and in the presence of Mansoor Floyd MD.

## 2024-11-04 DIAGNOSIS — E78.2 MIXED HYPERLIPIDEMIA: ICD-10-CM

## 2024-11-04 RX ORDER — ROSUVASTATIN CALCIUM 10 MG/1
10 TABLET, COATED ORAL DAILY
Qty: 90 TABLET | Refills: 3 | Status: SHIPPED | OUTPATIENT
Start: 2024-11-04

## 2024-11-04 NOTE — ASSESSMENT & PLAN NOTE
"Patient called in requesting a referral to an ENT for constant hoarseness, asked if she would like to see Dr Dietrich and she said no, just wants a \"throat\" doctor. Is this ok to enter?   " Lab Results   Component Value Date    CHOL 176 10/07/2023    CHOL 167 07/02/2022    CHOL 201 (H) 08/11/2020     Lab Results   Component Value Date    HDL 69.1 10/07/2023    HDL 71.0 07/02/2022    HDL 78.0 08/11/2020     Lab Results   Component Value Date    LDLCALC 96 (L) 10/07/2023     Lab Results   Component Value Date    TRIG 54 10/07/2023    TRIG 89 07/02/2022    TRIG 69 08/11/2020   Stable.  Continue rosuvastatin 10 mg daily.  Repeat lipid panel ordered.

## 2024-11-05 DIAGNOSIS — E03.9 ACQUIRED HYPOTHYROIDISM: ICD-10-CM

## 2024-11-05 RX ORDER — LEVOTHYROXINE SODIUM 25 UG/1
25 TABLET ORAL
Qty: 90 TABLET | Refills: 1 | Status: SHIPPED | OUTPATIENT
Start: 2024-11-05

## 2024-11-09 ENCOUNTER — LAB (OUTPATIENT)
Dept: LAB | Facility: LAB | Age: 64
End: 2024-11-09
Payer: COMMERCIAL

## 2024-11-09 DIAGNOSIS — Z12.5 PROSTATE CANCER SCREENING: ICD-10-CM

## 2024-11-09 DIAGNOSIS — E03.9 HYPOTHYROIDISM, UNSPECIFIED TYPE: ICD-10-CM

## 2024-11-09 DIAGNOSIS — Z00.00 WELLNESS EXAMINATION: ICD-10-CM

## 2024-11-09 PROCEDURE — 84153 ASSAY OF PSA TOTAL: CPT

## 2024-11-09 PROCEDURE — 80061 LIPID PANEL: CPT

## 2024-11-09 PROCEDURE — 80053 COMPREHEN METABOLIC PANEL: CPT

## 2024-11-09 PROCEDURE — 84439 ASSAY OF FREE THYROXINE: CPT

## 2024-11-09 PROCEDURE — 84443 ASSAY THYROID STIM HORMONE: CPT

## 2024-11-09 PROCEDURE — 85027 COMPLETE CBC AUTOMATED: CPT

## 2024-11-09 PROCEDURE — 36415 COLL VENOUS BLD VENIPUNCTURE: CPT

## 2024-11-10 LAB
ALBUMIN SERPL BCP-MCNC: 4.4 G/DL (ref 3.4–5)
ALP SERPL-CCNC: 49 U/L (ref 33–136)
ALT SERPL W P-5'-P-CCNC: 20 U/L (ref 10–52)
ANION GAP SERPL CALC-SCNC: 15 MMOL/L (ref 10–20)
AST SERPL W P-5'-P-CCNC: 16 U/L (ref 9–39)
BILIRUB SERPL-MCNC: 0.6 MG/DL (ref 0–1.2)
BUN SERPL-MCNC: 13 MG/DL (ref 6–23)
CALCIUM SERPL-MCNC: 9.3 MG/DL (ref 8.6–10.6)
CHLORIDE SERPL-SCNC: 104 MMOL/L (ref 98–107)
CHOLEST SERPL-MCNC: 177 MG/DL (ref 0–199)
CHOLESTEROL/HDL RATIO: 2.8
CO2 SERPL-SCNC: 27 MMOL/L (ref 21–32)
CREAT SERPL-MCNC: 0.95 MG/DL (ref 0.5–1.3)
EGFRCR SERPLBLD CKD-EPI 2021: 89 ML/MIN/1.73M*2
ERYTHROCYTE [DISTWIDTH] IN BLOOD BY AUTOMATED COUNT: 12.6 % (ref 11.5–14.5)
GLUCOSE SERPL-MCNC: 104 MG/DL (ref 74–99)
HCT VFR BLD AUTO: 45.2 % (ref 41–52)
HDLC SERPL-MCNC: 62.4 MG/DL
HGB BLD-MCNC: 14.8 G/DL (ref 13.5–17.5)
LDLC SERPL CALC-MCNC: 101 MG/DL
MCH RBC QN AUTO: 30.1 PG (ref 26–34)
MCHC RBC AUTO-ENTMCNC: 32.7 G/DL (ref 32–36)
MCV RBC AUTO: 92 FL (ref 80–100)
NON HDL CHOLESTEROL: 115 MG/DL (ref 0–149)
NRBC BLD-RTO: 0 /100 WBCS (ref 0–0)
PLATELET # BLD AUTO: 216 X10*3/UL (ref 150–450)
POTASSIUM SERPL-SCNC: 4.4 MMOL/L (ref 3.5–5.3)
PROT SERPL-MCNC: 6.9 G/DL (ref 6.4–8.2)
PSA SERPL-MCNC: 1.15 NG/ML
RBC # BLD AUTO: 4.92 X10*6/UL (ref 4.5–5.9)
SODIUM SERPL-SCNC: 142 MMOL/L (ref 136–145)
T4 FREE SERPL-MCNC: 1.03 NG/DL (ref 0.78–1.48)
TRIGL SERPL-MCNC: 67 MG/DL (ref 0–149)
TSH SERPL-ACNC: 4.29 MIU/L (ref 0.44–3.98)
VLDL: 13 MG/DL (ref 0–40)
WBC # BLD AUTO: 6.2 X10*3/UL (ref 4.4–11.3)

## 2024-11-12 DIAGNOSIS — Z12.12 SCREENING FOR COLORECTAL CANCER: Primary | ICD-10-CM

## 2024-11-12 DIAGNOSIS — Z12.11 SCREENING FOR COLORECTAL CANCER: Primary | ICD-10-CM

## 2024-12-02 LAB — NONINV COLON CA DNA+OCC BLD SCRN STL QL: NEGATIVE

## 2024-12-03 DIAGNOSIS — I10 PRIMARY HYPERTENSION: ICD-10-CM

## 2024-12-03 RX ORDER — LOSARTAN POTASSIUM 50 MG/1
50 TABLET ORAL DAILY
Qty: 90 TABLET | Refills: 1 | Status: SHIPPED | OUTPATIENT
Start: 2024-12-03

## 2025-01-29 DIAGNOSIS — R53.83 FATIGUE, UNSPECIFIED TYPE: Primary | ICD-10-CM

## 2025-02-22 LAB
IRON SATN MFR SERPL: 37 % (CALC) (ref 20–48)
IRON SERPL-MCNC: 107 MCG/DL (ref 50–180)
TESTOST SERPL-MCNC: 144 NG/DL (ref 250–827)
TIBC SERPL-MCNC: 290 MCG/DL (CALC) (ref 250–425)
VIT B12 SERPL-MCNC: 332 PG/ML (ref 200–1100)
ZINC SERPL-MCNC: 123 MCG/DL (ref 60–130)

## 2025-03-06 ENCOUNTER — APPOINTMENT (OUTPATIENT)
Dept: PRIMARY CARE | Facility: CLINIC | Age: 65
End: 2025-03-06
Payer: COMMERCIAL

## 2025-03-06 VITALS
SYSTOLIC BLOOD PRESSURE: 148 MMHG | WEIGHT: 195.2 LBS | BODY MASS INDEX: 27.33 KG/M2 | TEMPERATURE: 97.4 F | HEIGHT: 71 IN | DIASTOLIC BLOOD PRESSURE: 92 MMHG | RESPIRATION RATE: 14 BRPM | HEART RATE: 74 BPM | OXYGEN SATURATION: 98 %

## 2025-03-06 DIAGNOSIS — R53.83 FATIGUE, UNSPECIFIED TYPE: ICD-10-CM

## 2025-03-06 DIAGNOSIS — E66.3 OVERWEIGHT (BMI 25.0-29.9): ICD-10-CM

## 2025-03-06 DIAGNOSIS — E78.2 MIXED HYPERLIPIDEMIA: ICD-10-CM

## 2025-03-06 DIAGNOSIS — E03.9 ACQUIRED HYPOTHYROIDISM: ICD-10-CM

## 2025-03-06 DIAGNOSIS — I10 PRIMARY HYPERTENSION: Primary | ICD-10-CM

## 2025-03-06 DIAGNOSIS — R79.89 LOW TESTOSTERONE: ICD-10-CM

## 2025-03-06 PROBLEM — Z00.00 WELLNESS EXAMINATION: Status: ACTIVE | Noted: 2025-03-06

## 2025-03-06 PROCEDURE — 3008F BODY MASS INDEX DOCD: CPT | Performed by: FAMILY MEDICINE

## 2025-03-06 PROCEDURE — 1036F TOBACCO NON-USER: CPT | Performed by: FAMILY MEDICINE

## 2025-03-06 PROCEDURE — 1159F MED LIST DOCD IN RCRD: CPT | Performed by: FAMILY MEDICINE

## 2025-03-06 PROCEDURE — 99214 OFFICE O/P EST MOD 30 MIN: CPT | Performed by: FAMILY MEDICINE

## 2025-03-06 PROCEDURE — 3080F DIAST BP >= 90 MM HG: CPT | Performed by: FAMILY MEDICINE

## 2025-03-06 PROCEDURE — 3077F SYST BP >= 140 MM HG: CPT | Performed by: FAMILY MEDICINE

## 2025-03-06 RX ORDER — TESTOSTERONE 40.5 MG/2.5G
2.5 GEL TOPICAL DAILY
Qty: 90 PACKET | Refills: 0 | Status: SHIPPED | OUTPATIENT
Start: 2025-03-06

## 2025-03-06 ASSESSMENT — ENCOUNTER SYMPTOMS
NEUROLOGICAL NEGATIVE: 1
FATIGUE: 1
CARDIOVASCULAR NEGATIVE: 1
RESPIRATORY NEGATIVE: 1
PSYCHIATRIC NEGATIVE: 1
MUSCULOSKELETAL NEGATIVE: 1
GASTROINTESTINAL NEGATIVE: 1

## 2025-03-06 ASSESSMENT — PATIENT HEALTH QUESTIONNAIRE - PHQ9
1. LITTLE INTEREST OR PLEASURE IN DOING THINGS: NOT AT ALL
SUM OF ALL RESPONSES TO PHQ9 QUESTIONS 1 AND 2: 0
2. FEELING DOWN, DEPRESSED OR HOPELESS: NOT AT ALL

## 2025-03-06 NOTE — ASSESSMENT & PLAN NOTE
Lab Results   Component Value Date    TESTOSTERONE 144 (L) 02/20/2025   He is experiencing fatigue, lack of motivation    Interested in topical treatment.    CSA: 3/6/2025    Followup 2 months, repeat Testosterone levels week prior to that appointment

## 2025-03-06 NOTE — ASSESSMENT & PLAN NOTE
Lab Results   Component Value Date    TSH 4.29 (H) 11/09/2024   Currently on levothyroxine 25 mcg daily.

## 2025-03-06 NOTE — ASSESSMENT & PLAN NOTE
Blood pressure in office today:   BP Readings from Last 1 Encounters:   03/06/25 (!) 148/92   The goal range for blood pressure is below 130/80.   We will continue to monitor.   Continue losartan 50 mg daily

## 2025-03-06 NOTE — ASSESSMENT & PLAN NOTE
Lab Results   Component Value Date    CHOL 177 11/09/2024    CHOL 176 10/07/2023    CHOL 167 07/02/2022     Lab Results   Component Value Date    HDL 62.4 11/09/2024    HDL 69.1 10/07/2023    HDL 71.0 07/02/2022     Lab Results   Component Value Date    LDLCALC 101 (H) 11/09/2024    LDLCALC 96 (L) 10/07/2023     Lab Results   Component Value Date    TRIG 67 11/09/2024    TRIG 54 10/07/2023    TRIG 89 07/02/2022   Stable.  Continue rosuvastatin 10 mg daily.

## 2025-03-06 NOTE — PROGRESS NOTES
"Subjective   Patient ID: Raoul Younger is a 64 y.o. male who presents for annual physical/wellness visit.    he signed document informing that if problem issues also address at today's wellness visit that insurance may be appropriately billed so co-pay and deductible out of pocket expenses may occur.    Colorectal cancer screen: 11/21/2024 Cologuard  Tdap: 8/10/2020  HIV screen: 10/7/2023  Hepatitis C screen: 8/11/2020  Hepatitis B vaccine: aged out    He is ready to begin testosterone treatment. He feels fatigued a lot more often and a lot quicker. He is trying to get the  motivation to start exercising again. He is wanting to try the topical treatment first, if he does not see the improvement he is hoping for than he would be willing to discuss injections.   He has also noticed a few episodes of tachycardia, it can occur at random times even when he is just sitting down. At this moment he does not want to make any changes to his blood pressure medications because he feels that he can stabilize his blood pressure through lifestyle changes.          Review of Systems   Constitutional:  Positive for fatigue.   HENT: Negative.     Respiratory: Negative.     Cardiovascular: Negative.    Gastrointestinal: Negative.    Genitourinary: Negative.    Musculoskeletal: Negative.    Neurological: Negative.    Psychiatric/Behavioral: Negative.         Objective   BP (!) 148/92 (BP Location: Left arm, Patient Position: Sitting, BP Cuff Size: Large adult)   Pulse 74   Temp 36.3 °C (97.4 °F) (Temporal)   Resp 14   Ht 1.803 m (5' 11\")   Wt 88.5 kg (195 lb 3.2 oz)   SpO2 98%   BMI 27.22 kg/m²     Physical Exam  Constitutional:       Appearance: Normal appearance. He is overweight.   HENT:      Head: Normocephalic.      Right Ear: Tympanic membrane normal.      Left Ear: Tympanic membrane normal.      Nose: Nose normal.      Mouth/Throat:      Pharynx: Oropharynx is clear.   Eyes:      Conjunctiva/sclera: Conjunctivae normal. "   Cardiovascular:      Rate and Rhythm: Normal rate and regular rhythm.      Pulses: Normal pulses.      Heart sounds: Normal heart sounds.   Pulmonary:      Effort: Pulmonary effort is normal.      Breath sounds: Normal breath sounds.   Abdominal:      General: Abdomen is flat. Bowel sounds are normal.      Palpations: Abdomen is soft.   Musculoskeletal:      Cervical back: Normal range of motion.   Neurological:      General: No focal deficit present.      Mental Status: He is alert.   Psychiatric:         Mood and Affect: Mood normal.         Behavior: Behavior normal.         Thought Content: Thought content normal.         Judgment: Judgment normal.         Assessment/Plan   Problem List Items Addressed This Visit       Mixed hyperlipidemia     Lab Results   Component Value Date    CHOL 177 11/09/2024    CHOL 176 10/07/2023    CHOL 167 07/02/2022     Lab Results   Component Value Date    HDL 62.4 11/09/2024    HDL 69.1 10/07/2023    HDL 71.0 07/02/2022     Lab Results   Component Value Date    LDLCALC 101 (H) 11/09/2024    LDLCALC 96 (L) 10/07/2023     Lab Results   Component Value Date    TRIG 67 11/09/2024    TRIG 54 10/07/2023    TRIG 89 07/02/2022   Stable.  Continue rosuvastatin 10 mg daily.         Primary hypertension - Primary     Blood pressure in office today:   BP Readings from Last 1 Encounters:   03/06/25 (!) 148/92   The goal range for blood pressure is below 130/80.   We will continue to monitor.   Continue losartan 50 mg daily          Acquired hypothyroidism     Lab Results   Component Value Date    TSH 4.29 (H) 11/09/2024   Currently on levothyroxine 25 mcg daily.         Overweight (BMI 25.0-29.9)     Last 3 BMI readings:  BMI Readings from Last 3 Encounters:   03/06/25 27.22 kg/m²   10/10/24 27.21 kg/m²   06/03/24 27.06 kg/m²   Recent vacation - did not focus on healthy diet    Ideal weight is BMI 25 or under.  Think of healthy lifestyle changes rather than a diet  Weight loss is 75% diet  "and 25% exercise   Think of daily plate that includes 50% vegetables and do not count peas, corn, white potatoes as a vegetable. 25% complex grains/starch, 25% protein/fat.  Ideal diet is predominately plant based - Vegetables and Fruit. Protein from non meat sources ideal (whole beans, chickpeas). If choosing meat source for protein - first choice is fish in omega-3 such as [place]. Next choice is skinless poultry and last choice and infrequent should be red meat.  Weight loss can be helped through mindful eating. There are apps that can be used to assist with keeping track of your food water and exercise, such as My fitness pal Can see nutritionist if preferred.   Losing 4-6 lbs a month is a sufficient means of gradually losing and maintaining weight loss (good healthy, long term weight loss).          Low testosterone     Lab Results   Component Value Date    TESTOSTERONE 144 (L) 02/20/2025   Interested in topical treatment.    CSA: 3/6/2025         Relevant Medications    testosterone 1.62 % (40.5 mg/2.5 gram) gel in packet     Follow up: Scheduled 4/10/2025 and schedule in May to check testosterone     Tips for your general wellness...;  Remember importance of daily aerobic exercise for 30minutes to help with both your physical and mental/emotional health.  ;  Take time twice a day to relax with focused breathing and relaxation.  A good source to learn \"mindfulness\" relaxation is a book \"Mindfulness for Beginners\" by Chinmay Amaya.  ;    Strive for healthy eating with plenty of water, fruits, vegetables, and choosing as much plant based diet as able  If do consume non plant protein, choose fish high in omega (like salmon or cod), skinless poultry, and rarely anything from a cow  Avoid processed foods.  ;  Shop the perimeter of the grocery store  - not the inner aisles where all the boxed, bagged, and canned process non natural foods are   Avoid sugar - including fruit juices with added sugar and avoid " artificial sweeteners (sucralose, aspartame).; if want to use sweetener, use stevia (natural plant based non caloric sweetener)  Recommended guided nutrition plans include Mediterranean Diet - online resources available     Get plenty of sleep nightly - 7 hours minimum;    Exercise 150min per week;    Do not use tobacco    Abstain or limit alcohol to 1-2 drinks per 24 hours    See your dentist at least yearly    Have an eye check at least every 5 years    Follow up 1 year for annual wellness checkup;    Scribe Attestation  By signing my name below, ICayla Scribe   attest that this documentation has been prepared under the direction and in the presence of Mansoor Floyd MD.

## 2025-03-06 NOTE — ASSESSMENT & PLAN NOTE
Last 3 BMI readings:  BMI Readings from Last 3 Encounters:   03/06/25 27.22 kg/m²   10/10/24 27.21 kg/m²   06/03/24 27.06 kg/m²   Recent vacation - did not focus on healthy diet    Ideal weight is BMI 25 or under.  Think of healthy lifestyle changes rather than a diet  Weight loss is 75% diet and 25% exercise   Think of daily plate that includes 50% vegetables and do not count peas, corn, white potatoes as a vegetable. 25% complex grains/starch, 25% protein/fat.  Ideal diet is predominately plant based - Vegetables and Fruit. Protein from non meat sources ideal (whole beans, chickpeas). If choosing meat source for protein - first choice is fish in omega-3 such as [place]. Next choice is skinless poultry and last choice and infrequent should be red meat.  Weight loss can be helped through mindful eating. There are apps that can be used to assist with keeping track of your food water and exercise, such as My fitness pal Can see nutritionist if preferred.   Losing 4-6 lbs a month is a sufficient means of gradually losing and maintaining weight loss (good healthy, long term weight loss).

## 2025-04-10 ENCOUNTER — APPOINTMENT (OUTPATIENT)
Dept: PRIMARY CARE | Facility: CLINIC | Age: 65
End: 2025-04-10
Payer: COMMERCIAL

## 2025-04-22 DIAGNOSIS — E03.9 ACQUIRED HYPOTHYROIDISM: ICD-10-CM

## 2025-04-22 RX ORDER — LEVOTHYROXINE SODIUM 25 UG/1
TABLET ORAL
Qty: 90 TABLET | Refills: 1 | Status: SHIPPED | OUTPATIENT
Start: 2025-04-22

## 2025-05-12 DIAGNOSIS — R53.83 FATIGUE, UNSPECIFIED TYPE: ICD-10-CM

## 2025-05-15 DIAGNOSIS — R79.89 LOW TESTOSTERONE: ICD-10-CM

## 2025-05-15 DIAGNOSIS — E78.2 MIXED HYPERLIPIDEMIA: ICD-10-CM

## 2025-05-15 DIAGNOSIS — I10 PRIMARY HYPERTENSION: ICD-10-CM

## 2025-05-15 RX ORDER — TESTOSTERONE 40.5 MG/2.5G
2.5 GEL TOPICAL DAILY
Qty: 90 PACKET | Refills: 0 | Status: SHIPPED | OUTPATIENT
Start: 2025-05-15

## 2025-05-15 RX ORDER — LOSARTAN POTASSIUM 50 MG/1
50 TABLET ORAL DAILY
Qty: 90 TABLET | Refills: 1 | Status: SHIPPED | OUTPATIENT
Start: 2025-05-15

## 2025-05-15 NOTE — TELEPHONE ENCOUNTER
Next Appt  With Primary Care (Mansoor Floyd MD)  05/22/2025 at 9:00 AM      [facility] (supporting Mansoor Floyd MD) (Selected Message)    5/15/25  8:34 AM  hi there [facility]   am [facility] one or all of my refills. Could you please look into this and advise me if there is something i need to do to or your office handles it. THANKS. I have an appt with Dr. Floyd next thursday.     Al

## 2025-05-18 LAB — TESTOST SERPL-MCNC: 547 NG/DL (ref 250–827)

## 2025-05-22 ENCOUNTER — APPOINTMENT (OUTPATIENT)
Dept: PRIMARY CARE | Facility: CLINIC | Age: 65
End: 2025-05-22
Payer: COMMERCIAL

## 2025-05-22 ENCOUNTER — HOSPITAL ENCOUNTER (OUTPATIENT)
Dept: RADIOLOGY | Facility: CLINIC | Age: 65
Discharge: HOME | End: 2025-05-22
Payer: COMMERCIAL

## 2025-05-22 VITALS
HEART RATE: 67 BPM | OXYGEN SATURATION: 95 % | SYSTOLIC BLOOD PRESSURE: 132 MMHG | BODY MASS INDEX: 27.43 KG/M2 | WEIGHT: 196.7 LBS | RESPIRATION RATE: 14 BRPM | TEMPERATURE: 97.4 F | DIASTOLIC BLOOD PRESSURE: 87 MMHG

## 2025-05-22 DIAGNOSIS — C10.9 OROPHARYNGEAL CANCER (MULTI): ICD-10-CM

## 2025-05-22 DIAGNOSIS — E03.9 ACQUIRED HYPOTHYROIDISM: ICD-10-CM

## 2025-05-22 DIAGNOSIS — E03.9 HYPOTHYROIDISM, UNSPECIFIED TYPE: ICD-10-CM

## 2025-05-22 DIAGNOSIS — I10 PRIMARY HYPERTENSION: Primary | ICD-10-CM

## 2025-05-22 DIAGNOSIS — R79.89 LOW TESTOSTERONE: ICD-10-CM

## 2025-05-22 LAB
BASOPHILS # BLD AUTO: 28 CELLS/UL (ref 0–200)
BASOPHILS NFR BLD AUTO: 0.5 %
EOSINOPHIL # BLD AUTO: 67 CELLS/UL (ref 15–500)
EOSINOPHIL NFR BLD AUTO: 1.2 %
ERYTHROCYTE [DISTWIDTH] IN BLOOD BY AUTOMATED COUNT: 12.7 % (ref 11–15)
HCT VFR BLD AUTO: 48.3 % (ref 38.5–50)
HGB BLD-MCNC: 15.7 G/DL (ref 13.2–17.1)
LYMPHOCYTES # BLD AUTO: 963 CELLS/UL (ref 850–3900)
LYMPHOCYTES NFR BLD AUTO: 17.2 %
MCH RBC QN AUTO: 30.4 PG (ref 27–33)
MCHC RBC AUTO-ENTMCNC: 32.5 G/DL (ref 32–36)
MCV RBC AUTO: 93.6 FL (ref 80–100)
MONOCYTES # BLD AUTO: 627 CELLS/UL (ref 200–950)
MONOCYTES NFR BLD AUTO: 11.2 %
NEUTROPHILS # BLD AUTO: 3914 CELLS/UL (ref 1500–7800)
NEUTROPHILS NFR BLD AUTO: 69.9 %
PLATELET # BLD AUTO: 209 THOUSAND/UL (ref 140–400)
PMV BLD REES-ECKER: 11.5 FL (ref 7.5–12.5)
RBC # BLD AUTO: 5.16 MILLION/UL (ref 4.2–5.8)
TSH SERPL-ACNC: 4.37 MIU/L (ref 0.4–4.5)
WBC # BLD AUTO: 5.6 THOUSAND/UL (ref 3.8–10.8)

## 2025-05-22 PROCEDURE — 71046 X-RAY EXAM CHEST 2 VIEWS: CPT | Performed by: RADIOLOGY

## 2025-05-22 PROCEDURE — 1159F MED LIST DOCD IN RCRD: CPT | Performed by: FAMILY MEDICINE

## 2025-05-22 PROCEDURE — 3079F DIAST BP 80-89 MM HG: CPT | Performed by: FAMILY MEDICINE

## 2025-05-22 PROCEDURE — 3075F SYST BP GE 130 - 139MM HG: CPT | Performed by: FAMILY MEDICINE

## 2025-05-22 PROCEDURE — 71046 X-RAY EXAM CHEST 2 VIEWS: CPT

## 2025-05-22 PROCEDURE — 1160F RVW MEDS BY RX/DR IN RCRD: CPT | Performed by: FAMILY MEDICINE

## 2025-05-22 PROCEDURE — 99214 OFFICE O/P EST MOD 30 MIN: CPT | Performed by: FAMILY MEDICINE

## 2025-05-22 ASSESSMENT — ENCOUNTER SYMPTOMS
CARDIOVASCULAR NEGATIVE: 1
RESPIRATORY NEGATIVE: 1
NEUROLOGICAL NEGATIVE: 1
GASTROINTESTINAL NEGATIVE: 1
PSYCHIATRIC NEGATIVE: 1
MUSCULOSKELETAL NEGATIVE: 1

## 2025-05-22 ASSESSMENT — PATIENT HEALTH QUESTIONNAIRE - PHQ9
SUM OF ALL RESPONSES TO PHQ9 QUESTIONS 1 AND 2: 0
2. FEELING DOWN, DEPRESSED OR HOPELESS: NOT AT ALL
1. LITTLE INTEREST OR PLEASURE IN DOING THINGS: NOT AT ALL

## 2025-05-22 NOTE — ASSESSMENT & PLAN NOTE
Lab Results   Component Value Date    TESTOSTERONE 547 05/17/2025    TESTOSTERONE 144 (L) 02/20/2025   CSA: 3/6/2025  Currently using testosterone 2.5 g gel daily.

## 2025-05-22 NOTE — ASSESSMENT & PLAN NOTE
Blood pressure in office today:   BP Readings from Last 1 Encounters:   05/22/25 132/87   The goal range for blood pressure is below 130/80.   We will continue to monitor.   Continue losartan 50 mg daily

## 2025-05-22 NOTE — ASSESSMENT & PLAN NOTE
R sided tongue pain when moving to L, inflammation noted on exam.  Recommend scheduling appt w/ oncology - will order blood work to be done prior to appt

## 2025-05-22 NOTE — PROGRESS NOTES
Subjective   Patient ID: Raoul Younger is a 64 y.o. male who presents for Follow-up (Right side tongue - discomfort when stretching, concerned d/t previous hx throat cancer).    Since starting testosterone treatment there has been improvement in his blood work. He states he does not overly feel any different but he does not feel worse.   A few weeks ago he first started noticing pain in the right side of his tongue but only when he moves it to the left, almost like it is tethered real tight. He has been doing tongue exercises since having oral cancer. He does not currently have an appointment scheduled with his oncologist.          Review of Systems   HENT:  Positive for dental problem (tongue pain - R side).    Respiratory: Negative.     Cardiovascular: Negative.    Gastrointestinal: Negative.    Genitourinary: Negative.    Musculoskeletal: Negative.    Neurological: Negative.    Psychiatric/Behavioral: Negative.         Objective   /87 (BP Location: Left arm, Patient Position: Sitting, BP Cuff Size: Adult)   Pulse 67   Temp 36.3 °C (97.4 °F) (Temporal)   Resp 14   Wt 89.2 kg (196 lb 11.2 oz)   SpO2 95%   BMI 27.43 kg/m²     Physical Exam  Constitutional:       Appearance: Normal appearance. He is overweight.   HENT:      Head: Normocephalic.      Mouth/Throat:      Comments: + tongue inflammation, R side in back of mouth  Eyes:      Conjunctiva/sclera: Conjunctivae normal.   Musculoskeletal:      Cervical back: Normal range of motion.   Neurological:      General: No focal deficit present.      Mental Status: He is alert.   Psychiatric:         Mood and Affect: Mood normal.         Behavior: Behavior normal.         Thought Content: Thought content normal.         Judgment: Judgment normal.         Assessment/Plan   Problem List Items Addressed This Visit       Primary hypertension - Primary    Blood pressure in office today:   BP Readings from Last 1 Encounters:   05/22/25 132/87   The goal range for  blood pressure is below 130/80.   We will continue to monitor.   Continue losartan 50 mg daily          Acquired hypothyroidism    Lab Results   Component Value Date    TSH 4.29 (H) 11/09/2024   Currently on levothyroxine 25 mcg daily.         Relevant Orders    CBC and Auto Differential    TSH with reflex to Free T4 if abnormal    Oropharyngeal cancer (Multi)    R sided tongue pain when moving to L, inflammation noted on exam.  Recommend scheduling appt w/ oncology - will order blood work to be done prior to appt         Relevant Orders    CBC and Auto Differential    XR chest 2 views    Low testosterone    Lab Results   Component Value Date    TESTOSTERONE 547 05/17/2025    TESTOSTERONE 144 (L) 02/20/2025   CSA: 3/6/2025  Currently using testosterone 2.5 g gel daily.          Other Visit Diagnoses         Hypothyroidism, unspecified type              Follow up: Scheduled 11/6/2025    Scribe Attestation  By signing my name below, ICayla Scribphilip   attest that this documentation has been prepared under the direction and in the presence of Mansoor Floyd MD.

## 2025-05-27 ASSESSMENT — ENCOUNTER SYMPTOMS
ADENOPATHY: 0
FATIGUE: 0
NECK PAIN: 0
FACIAL SWELLING: 0
UNEXPECTED WEIGHT CHANGE: 0
COUGH: 0
SORE THROAT: 0
CHILLS: 0
FEVER: 0
VOMITING: 0
APPETITE CHANGE: 0
TROUBLE SWALLOWING: 0
VOICE CHANGE: 0
SHORTNESS OF BREATH: 0
STRIDOR: 0
NAUSEA: 0

## 2025-05-27 NOTE — PROGRESS NOTES
Cancer follow up  TSH: Managed by PCP  Chest: Due now    Chief Complaint   Patient presents with    Follow-up     HPI:  Raoul Younger is a 64 y.o. male following up with me today for his T2N0M0 SCCa, HPV+ left tonsil cancer s/p XRT alone completed 3/1/19. Last seen 6/2024.  Dysphagia mild, xerostomia mild.  No odynophagia or otalgia.  Doing well otherwise.  No concerns today.  Due for CXR today.    History:  Dx: T2N0M0 left tonsil SCCa, HPV+  8/18: +odynophagia  9/18: Seen by his PCP dx with viral URI.   10/18: Followed up with his PCP when his symptoms did not improve. Strep negative. He was treated with antibiotics. 10/18: He returned 2 weeks later when there was no change with antibiotics. Treated with PPI without any change.   11/15/18: Referral to Dr. Saenz who diagnosed a left tonsil mass.   11/27/18: CT neck with contrast obtained 11/27/18 which showed a 2.1x1.9x2.5cm enhancing mass in the superior left oropharynx consistent with tonsillar neoplasm. No pathologic lymphadenopathy.   12/11/18: DL/biopsy, esoph/bronch, final pathology HPV+ SCCa, mass of the superior tonsil with significant involvement of the soft palate  12/26/18: CT chest negative for metastatic disease  1/22-3/1/19: Completed curative radiation therapy 70Gy in 35 fractions, no tx breaks.  8/2/19: CT neck w/contrast no evidence of recurrent masses or lesions  12/19: CXR negative  12/19: TSH (3.67) normal  1/2020: Insurance denied post treatment PET  3/20: Hand foot and mouth. Strep negative   8/20: TSH 4.37 (mild hypothyroid)  12/20: CXR normal  12/20: TSH 4.38 (mild hypothyroid)  5/21: TSH normal on synthroid  7/22: TSH normal on synthroid  10/22: CXR normal  10/23: CXR normal, TSH normal on synthroid     SH:  Tob: Never  Marijuana. quit 20 years ago   ETOH: beer and wine 1-2 drinks/day   Here alone    ROS:  Review of Systems   Constitutional:  Negative for appetite change, chills, fatigue, fever and unexpected weight change.   HENT:   Negative for dental problem, drooling, ear pain, facial swelling, hearing loss, mouth sores, sore throat, tinnitus, trouble swallowing and voice change.    Respiratory:  Negative for cough, shortness of breath and stridor.    Gastrointestinal:  Negative for nausea and vomiting.   Musculoskeletal:  Negative for neck pain.   Hematological:  Negative for adenopathy.   All other systems reviewed and are negative.       PE:  ENT Physical Exam  Constitutional  Appearance: patient appears well-developed, patient is cooperative;  Communication/Voice: Communication comments: Coarse voice which is stable for him  Head and Face  Appearance: head appears normal and face appears normal;  Ear  Auricles: right auricle normal; left auricle normal;  Ear Canals: right ear canal normal; left ear canal normal;  Tympanic Membranes: right tympanic membrane normal; left tympanic membrane normal;  Nose  External Nose: nares patent bilaterally; external nose normal;  Internal Nose: nasal mucosa normal; septum normal;  Oral Cavity/Oropharynx  Lips: normal;  Gums: gingiva normal;  Tongue: normal;  Oral mucosa: mucous membranes dry;  OC/OP comments: Oropharynx negative, new masses or lesions, soft to palpation  Neck  Neck: radiation changes present; normal trachea;  Respiratory  Inspection: breathing unlabored;  Cardiovascular  Inspection: extremities are warm and well perfused;  Lymphatic  Palpation: no cervical adenopathy noted;  Neurovestibular  Mental Status: alert and oriented;  Psychiatric: mood normal; affect is appropriate;       Procedures   PROCEDURE NOTE:  Recommended flexible nasopharyngoscopy.  Risks, benefits, personnel and alternatives were explained.  The patient wished to proceed.  S/he was re-identified.  PROCEDURE:  Flexible nasopharyngoscopy  PREOPERATIVE DIAGNOSIS: Oropharyngeal cancer surveillance  POSTOPERATIVE DIAGNOSIS: No evidence of recurrence, radiation changes, no masses/lesions  INDICATIONS: Inability to tolerate  mirror exam  PROCEDURE NOTE:  Recommended flexible nasopharyngoscopy.  Risks, benefits, personnel and alternatives were explained.  The patient wished to proceed.  S/he was re-identified.  FINDINGS:  The nasopharynx was normal without any lesions or masses visualized.  Oropharynx with radiation changes but no masses.  No masses or lesions were visualized at the base of tongue, vallecula, epiglottis, aryepiglottic folds, pyriform sinuses, and lateral pharyngeal walls.  True vocal fold movement was normal.  The patient's airway was widely patent with no evidence of obstruction.  Patient tolerated the procedure well, and there were no complications.     ASSESSMENT AND PLAN:  Problem List Items Addressed This Visit       Dry mouth    Current Assessment & Plan   Chronic, adverse effect of radiation  Continue humidification         Acquired hypothyroidism - Primary    Current Assessment & Plan   Adverse effect of radiation  Chronic  Continue daily synthroid         Oropharyngeal cancer (Multi)    Overview   Diagnoses 12/2018  Radiation treatment 1/2019-3/2019         Current Assessment & Plan   No evidence of disease  Endoscopy is negative  Annual CXR due now, ordered today, will reach out with results when available  Follow up in 1 year with new H&N attending, discussed transition in light of my leaving         Oropharyngeal dysphagia    Current Assessment & Plan   Chronic, mild   Continue current diet, weight is stable             Elvia Faust MD    Head & Neck Surgical Oncology & Reconstruction  Department of Otolaryngology - Head and Neck Surgery     By signing my name below, I, Jennie Arteaga, attest that this documentation has been prepared under the direction and in the presence of Dr. Elvia Faust MD.     All medical record entries made by the Scribe were at my direction and personally dictated by me, Dr. Elvia Faust. I have reviewed the chart and agree that the record accurately reflects my  personal performance of the history, physical exam, discussion and plan.

## 2025-06-02 ENCOUNTER — APPOINTMENT (OUTPATIENT)
Dept: OTOLARYNGOLOGY | Facility: CLINIC | Age: 65
End: 2025-06-02
Payer: COMMERCIAL

## 2025-06-02 VITALS — WEIGHT: 198 LBS | TEMPERATURE: 98.6 F | HEIGHT: 71 IN | BODY MASS INDEX: 27.72 KG/M2

## 2025-06-02 DIAGNOSIS — C10.9 OROPHARYNGEAL CANCER (MULTI): ICD-10-CM

## 2025-06-02 DIAGNOSIS — K14.8 TONGUE LESION: ICD-10-CM

## 2025-06-02 DIAGNOSIS — R68.2 DRY MOUTH: ICD-10-CM

## 2025-06-02 DIAGNOSIS — R13.12 OROPHARYNGEAL DYSPHAGIA: ICD-10-CM

## 2025-06-02 DIAGNOSIS — E03.9 ACQUIRED HYPOTHYROIDISM: Primary | ICD-10-CM

## 2025-06-02 PROCEDURE — 3008F BODY MASS INDEX DOCD: CPT | Performed by: OTOLARYNGOLOGY

## 2025-06-02 PROCEDURE — 1159F MED LIST DOCD IN RCRD: CPT | Performed by: OTOLARYNGOLOGY

## 2025-06-02 PROCEDURE — 99214 OFFICE O/P EST MOD 30 MIN: CPT | Performed by: OTOLARYNGOLOGY

## 2025-06-02 PROCEDURE — 92511 NASOPHARYNGOSCOPY: CPT | Performed by: OTOLARYNGOLOGY

## 2025-06-02 PROCEDURE — 1160F RVW MEDS BY RX/DR IN RCRD: CPT | Performed by: OTOLARYNGOLOGY

## 2025-06-02 RX ORDER — TRIAMCINOLONE ACETONIDE 5 MG/G
CREAM TOPICAL 3 TIMES DAILY
Qty: 15 G | Refills: 0 | Status: SHIPPED | OUTPATIENT
Start: 2025-06-02

## 2025-06-02 RX ORDER — DEXAMETHASONE 0.5 MG/5ML
SOLUTION ORAL
Qty: 300 ML | Refills: 0 | Status: SHIPPED | OUTPATIENT
Start: 2025-06-02 | End: 2025-06-03 | Stop reason: SDUPTHER

## 2025-06-02 ASSESSMENT — PATIENT HEALTH QUESTIONNAIRE - PHQ9
2. FEELING DOWN, DEPRESSED OR HOPELESS: NOT AT ALL
SUM OF ALL RESPONSES TO PHQ9 QUESTIONS 1 AND 2: 0
1. LITTLE INTEREST OR PLEASURE IN DOING THINGS: NOT AT ALL

## 2025-06-03 DIAGNOSIS — K14.8 TONGUE LESION: ICD-10-CM

## 2025-06-03 RX ORDER — DEXAMETHASONE 0.5 MG/5ML
SOLUTION ORAL
Qty: 300 ML | Refills: 0 | Status: SHIPPED | OUTPATIENT
Start: 2025-06-03

## 2025-06-15 NOTE — ASSESSMENT & PLAN NOTE
No evidence of disease  Endoscopy is negative  Annual CXR due now, ordered today, will reach out with results when available  Follow up in 1 year with new H&N attending, discussed transition in light of my leaving

## 2025-06-16 ENCOUNTER — APPOINTMENT (OUTPATIENT)
Dept: OTOLARYNGOLOGY | Facility: CLINIC | Age: 65
End: 2025-06-16
Payer: COMMERCIAL

## 2025-07-28 ENCOUNTER — TELEPHONE (OUTPATIENT)
Dept: PRIMARY CARE | Facility: CLINIC | Age: 65
End: 2025-07-28

## 2025-07-29 ENCOUNTER — OFFICE VISIT (OUTPATIENT)
Dept: PRIMARY CARE | Facility: CLINIC | Age: 65
End: 2025-07-29
Payer: COMMERCIAL

## 2025-07-29 VITALS
DIASTOLIC BLOOD PRESSURE: 72 MMHG | SYSTOLIC BLOOD PRESSURE: 135 MMHG | BODY MASS INDEX: 27.69 KG/M2 | RESPIRATION RATE: 16 BRPM | HEART RATE: 95 BPM | TEMPERATURE: 97.1 F | OXYGEN SATURATION: 97 % | WEIGHT: 198.5 LBS

## 2025-07-29 DIAGNOSIS — L25.5 RHUS DERMATITIS: Primary | ICD-10-CM

## 2025-07-29 PROCEDURE — 3075F SYST BP GE 130 - 139MM HG: CPT | Performed by: FAMILY MEDICINE

## 2025-07-29 PROCEDURE — 1036F TOBACCO NON-USER: CPT | Performed by: FAMILY MEDICINE

## 2025-07-29 PROCEDURE — 1160F RVW MEDS BY RX/DR IN RCRD: CPT | Performed by: FAMILY MEDICINE

## 2025-07-29 PROCEDURE — 1159F MED LIST DOCD IN RCRD: CPT | Performed by: FAMILY MEDICINE

## 2025-07-29 PROCEDURE — 99213 OFFICE O/P EST LOW 20 MIN: CPT | Performed by: FAMILY MEDICINE

## 2025-07-29 PROCEDURE — 3078F DIAST BP <80 MM HG: CPT | Performed by: FAMILY MEDICINE

## 2025-07-29 RX ORDER — PREDNISONE 10 MG/1
TABLET ORAL
Qty: 30 TABLET | Refills: 0 | Status: SHIPPED | OUTPATIENT
Start: 2025-07-29 | End: 2025-08-10

## 2025-07-29 NOTE — PROGRESS NOTES
Subjective   Patient ID: Raoul Younger is a 65 y.o. male who presents for Poison Ivy (/). The patient was made aware that AI (artificial intelligence) technology will be utilized during today's visit. The patient expressed understanding and verbally accepts the use of AI technology.     Poison Ivy       History of Present Illness  Raoul Yuonger is a 65 year old male who presents with a poison ivy rash.    He developed a poison ivy rash after doing yard work last Thursday, primarily located on his legs in minimal locations. However, most of the rash is located on his penis. He initially noticed a few spots and treated them with Aveeno lotion and gauze wraps. The weather was hot and humid, which he believes may have contributed to the rash's development.    He has used Aveeno lotion, which contains chamomile, to manage the rash. There has been no significant spreading of the rash, but he is concerned about potential spread due to environmental factors. He recalls a previous episode about a decade ago, where he received a prescription for oral medication, possibly prednisone, which helped manage the symptoms.    The rash is not present in the groin area. He has not experienced any adverse effects from previous treatments and is open to using antihistamines like Claritin or Benadryl to manage itching. He is also considering using famotidine.    Review of Systems   Skin:  Positive for rash.   All other systems reviewed and are negative.    Objective   /72 (BP Location: Right arm, Patient Position: Sitting, BP Cuff Size: Large adult)   Pulse 95   Temp 36.2 °C (97.1 °F) (Temporal)   Resp 16   Wt 90 kg (198 lb 8 oz)   SpO2 97%   BMI 27.69 kg/m²     Physical Exam  Chaperone Present: Declined.  Constitutional: Well developed, well nourished, alert and in no acute distress   Eyes: Normal external exam.   Cardiovascular: No peripheral edema.  Pulmonary: No respiratory distress  Skin: Warm, well perfused, normal  skin turgor, erythematous vesiculopapular lesions on penis  Neurologic: Cranial nerves II-XII grossly intact.   Psychiatric: Mood calm and affect normal.    Assessment/Plan   OTC meds to alleviate itching for the next 5-7 days:   Claritin 10mg 1-2x/day  Benadryl 25mg at night time  Famotidine 20mg (stomach acid reducing section) 1-2x/day    Assessment & Plan  Allergic contact dermatitis due to poison ivy  Allergic contact dermatitis from poison ivy exposure last Thursday during yard work. Current symptoms include itching and discomfort, managed with Aveeno lotion. No signs of secondary infection, but risk of fungal infection in warm, moist environments discussed. Oral prednisone is preferred due to the location of the rash, as topical treatment is not feasible.  - Prescribe prednisone 40 mg daily for 3 days, then taper to 30 mg, 20mg and 10mg as directged using 10 mg tablets.  - Advise taking prednisone in the morning to avoid insomnia.  - Instruct to monitor for any adverse effects from prednisone and report if issues arise.

## 2025-08-25 DIAGNOSIS — I10 PRIMARY HYPERTENSION: ICD-10-CM

## 2025-08-25 RX ORDER — LOSARTAN POTASSIUM 50 MG/1
50 TABLET ORAL DAILY
Qty: 90 TABLET | Refills: 1 | Status: SHIPPED | OUTPATIENT
Start: 2025-08-25

## 2025-08-26 DIAGNOSIS — E03.9 ACQUIRED HYPOTHYROIDISM: ICD-10-CM

## 2025-08-26 RX ORDER — LEVOTHYROXINE SODIUM 25 UG/1
25 TABLET ORAL DAILY
Qty: 90 TABLET | Refills: 1 | Status: SHIPPED | OUTPATIENT
Start: 2025-08-26

## 2025-11-06 ENCOUNTER — APPOINTMENT (OUTPATIENT)
Dept: PRIMARY CARE | Facility: CLINIC | Age: 65
End: 2025-11-06
Payer: COMMERCIAL